# Patient Record
Sex: MALE | Race: WHITE | HISPANIC OR LATINO | Employment: FULL TIME | URBAN - METROPOLITAN AREA
[De-identification: names, ages, dates, MRNs, and addresses within clinical notes are randomized per-mention and may not be internally consistent; named-entity substitution may affect disease eponyms.]

---

## 2018-10-24 ENCOUNTER — TELEPHONE (OUTPATIENT)
Dept: MEDICAL GROUP | Facility: LAB | Age: 38
End: 2018-10-24

## 2018-10-24 ENCOUNTER — HOSPITAL ENCOUNTER (INPATIENT)
Facility: MEDICAL CENTER | Age: 38
LOS: 2 days | DRG: 638 | End: 2018-10-26
Attending: EMERGENCY MEDICINE | Admitting: HOSPITALIST
Payer: COMMERCIAL

## 2018-10-24 DIAGNOSIS — E10.10 DIABETIC KETOACIDOSIS WITHOUT COMA ASSOCIATED WITH TYPE 1 DIABETES MELLITUS (HCC): ICD-10-CM

## 2018-10-24 PROBLEM — E87.1 HYPONATREMIA: Status: ACTIVE | Noted: 2018-10-24

## 2018-10-24 PROBLEM — R17 SERUM TOTAL BILIRUBIN ELEVATED: Status: ACTIVE | Noted: 2018-10-24

## 2018-10-24 PROBLEM — E11.10 DKA (DIABETIC KETOACIDOSES): Status: ACTIVE | Noted: 2018-10-24

## 2018-10-24 PROBLEM — E86.0 DEHYDRATION: Status: ACTIVE | Noted: 2018-10-24

## 2018-10-24 LAB
ALBUMIN SERPL BCP-MCNC: 5 G/DL (ref 3.2–4.9)
ALBUMIN/GLOB SERPL: 1.5 G/DL
ALP SERPL-CCNC: 160 U/L (ref 30–99)
ALT SERPL-CCNC: 38 U/L (ref 2–50)
ANION GAP SERPL CALC-SCNC: 15 MMOL/L (ref 0–11.9)
ANION GAP SERPL CALC-SCNC: 19 MMOL/L (ref 0–11.9)
APPEARANCE UR: CLEAR
AST SERPL-CCNC: 30 U/L (ref 12–45)
B-OH-BUTYR SERPL-MCNC: 6.16 MMOL/L (ref 0.02–0.27)
BASE EXCESS BLDV CALC-SCNC: -5 MMOL/L
BASOPHILS # BLD AUTO: 0.4 % (ref 0–1.8)
BASOPHILS # BLD: 0.04 K/UL (ref 0–0.12)
BILIRUB SERPL-MCNC: 1.8 MG/DL (ref 0.1–1.5)
BILIRUB UR QL STRIP.AUTO: NEGATIVE
BODY TEMPERATURE: ABNORMAL CENTIGRADE
BUN SERPL-MCNC: 16 MG/DL (ref 8–22)
BUN SERPL-MCNC: 21 MG/DL (ref 8–22)
CALCIUM SERPL-MCNC: 8.8 MG/DL (ref 8.4–10.2)
CALCIUM SERPL-MCNC: 9.7 MG/DL (ref 8.4–10.2)
CHLORIDE SERPL-SCNC: 87 MMOL/L (ref 96–112)
CHLORIDE SERPL-SCNC: 94 MMOL/L (ref 96–112)
CO2 SERPL-SCNC: 18 MMOL/L (ref 20–33)
CO2 SERPL-SCNC: 20 MMOL/L (ref 20–33)
COLOR UR: ABNORMAL
CREAT SERPL-MCNC: 1 MG/DL (ref 0.5–1.4)
CREAT SERPL-MCNC: 1.12 MG/DL (ref 0.5–1.4)
EOSINOPHIL # BLD AUTO: 0.01 K/UL (ref 0–0.51)
EOSINOPHIL NFR BLD: 0.1 % (ref 0–6.9)
ERYTHROCYTE [DISTWIDTH] IN BLOOD BY AUTOMATED COUNT: 36 FL (ref 35.9–50)
GLOBULIN SER CALC-MCNC: 3.3 G/DL (ref 1.9–3.5)
GLUCOSE BLD-MCNC: 344 MG/DL (ref 65–99)
GLUCOSE BLD-MCNC: 347 MG/DL (ref 65–99)
GLUCOSE BLD-MCNC: 581 MG/DL (ref 65–99)
GLUCOSE SERPL-MCNC: 358 MG/DL (ref 65–99)
GLUCOSE SERPL-MCNC: 488 MG/DL (ref 65–99)
GLUCOSE UR STRIP.AUTO-MCNC: >=1000 MG/DL
HCO3 BLDV-SCNC: 20 MMOL/L (ref 24–28)
HCT VFR BLD AUTO: 42.2 % (ref 42–52)
HGB BLD-MCNC: 15.7 G/DL (ref 14–18)
IMM GRANULOCYTES # BLD AUTO: 0.03 K/UL (ref 0–0.11)
IMM GRANULOCYTES NFR BLD AUTO: 0.3 % (ref 0–0.9)
KETONES UR STRIP.AUTO-MCNC: >=80 MG/DL
LEUKOCYTE ESTERASE UR QL STRIP.AUTO: NEGATIVE
LYMPHOCYTES # BLD AUTO: 2.05 K/UL (ref 1–4.8)
LYMPHOCYTES NFR BLD: 20.5 % (ref 22–41)
MAGNESIUM SERPL-MCNC: 2.1 MG/DL (ref 1.5–2.5)
MCH RBC QN AUTO: 31.7 PG (ref 27–33)
MCHC RBC AUTO-ENTMCNC: 37.2 G/DL (ref 33.7–35.3)
MCV RBC AUTO: 85.3 FL (ref 81.4–97.8)
MICRO URNS: ABNORMAL
MONOCYTES # BLD AUTO: 0.67 K/UL (ref 0–0.85)
MONOCYTES NFR BLD AUTO: 6.7 % (ref 0–13.4)
MUCOUS THREADS #/AREA URNS HPF: ABNORMAL /HPF
NEUTROPHILS # BLD AUTO: 7.21 K/UL (ref 1.82–7.42)
NEUTROPHILS NFR BLD: 72 % (ref 44–72)
NITRITE UR QL STRIP.AUTO: NEGATIVE
NRBC # BLD AUTO: 0 K/UL
NRBC BLD-RTO: 0 /100 WBC
PCO2 BLDV: 36.2 MMHG (ref 41–51)
PH BLDV: 7.35 [PH] (ref 7.31–7.45)
PH UR STRIP.AUTO: 5.5 [PH]
PHOSPHATE SERPL-MCNC: 5.2 MG/DL (ref 2.5–4.5)
PLATELET # BLD AUTO: 261 K/UL (ref 164–446)
PMV BLD AUTO: 9.7 FL (ref 9–12.9)
PO2 BLDV: 58.2 MMHG (ref 25–40)
POTASSIUM SERPL-SCNC: 3.5 MMOL/L (ref 3.6–5.5)
POTASSIUM SERPL-SCNC: 4.2 MMOL/L (ref 3.6–5.5)
PROT SERPL-MCNC: 8.3 G/DL (ref 6–8.2)
PROT UR QL STRIP: NEGATIVE MG/DL
RBC # BLD AUTO: 4.95 M/UL (ref 4.7–6.1)
RBC # URNS HPF: ABNORMAL /HPF
RBC UR QL AUTO: ABNORMAL
SAO2 % BLDV: 88 %
SODIUM SERPL-SCNC: 124 MMOL/L (ref 135–145)
SODIUM SERPL-SCNC: 129 MMOL/L (ref 135–145)
SP GR UR STRIP.AUTO: 1.02
TSH SERPL DL<=0.005 MIU/L-ACNC: 1.54 UIU/ML (ref 0.38–5.33)
UNIDENT CRYS URNS QL MICRO: ABNORMAL /HPF
WBC # BLD AUTO: 10 K/UL (ref 4.8–10.8)
WBC #/AREA URNS HPF: ABNORMAL /HPF

## 2018-10-24 PROCEDURE — 80053 COMPREHEN METABOLIC PANEL: CPT

## 2018-10-24 PROCEDURE — 83036 HEMOGLOBIN GLYCOSYLATED A1C: CPT

## 2018-10-24 PROCEDURE — 80048 BASIC METABOLIC PNL TOTAL CA: CPT

## 2018-10-24 PROCEDURE — 85025 COMPLETE CBC W/AUTO DIFF WBC: CPT

## 2018-10-24 PROCEDURE — 96361 HYDRATE IV INFUSION ADD-ON: CPT

## 2018-10-24 PROCEDURE — 700105 HCHG RX REV CODE 258: Performed by: HOSPITALIST

## 2018-10-24 PROCEDURE — 81001 URINALYSIS AUTO W/SCOPE: CPT

## 2018-10-24 PROCEDURE — 700102 HCHG RX REV CODE 250 W/ 637 OVERRIDE(OP): Performed by: EMERGENCY MEDICINE

## 2018-10-24 PROCEDURE — 700105 HCHG RX REV CODE 258: Performed by: EMERGENCY MEDICINE

## 2018-10-24 PROCEDURE — 84443 ASSAY THYROID STIM HORMONE: CPT

## 2018-10-24 PROCEDURE — 82803 BLOOD GASES ANY COMBINATION: CPT

## 2018-10-24 PROCEDURE — 700102 HCHG RX REV CODE 250 W/ 637 OVERRIDE(OP)

## 2018-10-24 PROCEDURE — 700102 HCHG RX REV CODE 250 W/ 637 OVERRIDE(OP): Performed by: HOSPITALIST

## 2018-10-24 PROCEDURE — 770006 HCHG ROOM/CARE - MED/SURG/GYN SEMI*

## 2018-10-24 PROCEDURE — 84100 ASSAY OF PHOSPHORUS: CPT

## 2018-10-24 PROCEDURE — 83735 ASSAY OF MAGNESIUM: CPT

## 2018-10-24 PROCEDURE — 99223 1ST HOSP IP/OBS HIGH 75: CPT | Performed by: HOSPITALIST

## 2018-10-24 PROCEDURE — 99285 EMERGENCY DEPT VISIT HI MDM: CPT

## 2018-10-24 PROCEDURE — 96372 THER/PROPH/DIAG INJ SC/IM: CPT

## 2018-10-24 PROCEDURE — 36415 COLL VENOUS BLD VENIPUNCTURE: CPT

## 2018-10-24 PROCEDURE — 82010 KETONE BODYS QUAN: CPT

## 2018-10-24 PROCEDURE — 82962 GLUCOSE BLOOD TEST: CPT

## 2018-10-24 PROCEDURE — 96360 HYDRATION IV INFUSION INIT: CPT

## 2018-10-24 RX ORDER — SODIUM CHLORIDE, SODIUM LACTATE, POTASSIUM CHLORIDE, CALCIUM CHLORIDE 600; 310; 30; 20 MG/100ML; MG/100ML; MG/100ML; MG/100ML
1000 INJECTION, SOLUTION INTRAVENOUS ONCE
Status: COMPLETED | OUTPATIENT
Start: 2018-10-24 | End: 2018-10-24

## 2018-10-24 RX ORDER — SODIUM CHLORIDE, SODIUM LACTATE, POTASSIUM CHLORIDE, AND CALCIUM CHLORIDE .6; .31; .03; .02 G/100ML; G/100ML; G/100ML; G/100ML
1000 INJECTION, SOLUTION INTRAVENOUS ONCE
Status: COMPLETED | OUTPATIENT
Start: 2018-10-24 | End: 2018-10-24

## 2018-10-24 RX ORDER — INSULIN GLARGINE 100 [IU]/ML
10 INJECTION, SOLUTION SUBCUTANEOUS EVERY EVENING
Status: DISCONTINUED | OUTPATIENT
Start: 2018-10-24 | End: 2018-10-26 | Stop reason: HOSPADM

## 2018-10-24 RX ORDER — SODIUM CHLORIDE, SODIUM LACTATE, POTASSIUM CHLORIDE, CALCIUM CHLORIDE 600; 310; 30; 20 MG/100ML; MG/100ML; MG/100ML; MG/100ML
1000 INJECTION, SOLUTION INTRAVENOUS CONTINUOUS
Status: DISCONTINUED | OUTPATIENT
Start: 2018-10-24 | End: 2018-10-26 | Stop reason: HOSPADM

## 2018-10-24 RX ORDER — DEXTROSE MONOHYDRATE 25 G/50ML
25 INJECTION, SOLUTION INTRAVENOUS
Status: DISCONTINUED | OUTPATIENT
Start: 2018-10-24 | End: 2018-10-26 | Stop reason: HOSPADM

## 2018-10-24 RX ADMIN — SODIUM CHLORIDE, POTASSIUM CHLORIDE, SODIUM LACTATE AND CALCIUM CHLORIDE 1000 ML: 600; 310; 30; 20 INJECTION, SOLUTION INTRAVENOUS at 17:22

## 2018-10-24 RX ADMIN — INSULIN GLARGINE 10 UNITS: 100 INJECTION, SOLUTION SUBCUTANEOUS at 20:45

## 2018-10-24 RX ADMIN — INSULIN HUMAN 10 UNITS: 100 INJECTION, SOLUTION PARENTERAL at 16:31

## 2018-10-24 RX ADMIN — SODIUM CHLORIDE, POTASSIUM CHLORIDE, SODIUM LACTATE AND CALCIUM CHLORIDE 1000 ML: 600; 310; 30; 20 INJECTION, SOLUTION INTRAVENOUS at 14:46

## 2018-10-24 RX ADMIN — INSULIN HUMAN 4 UNITS: 100 INJECTION, SOLUTION PARENTERAL at 20:44

## 2018-10-24 RX ADMIN — SODIUM CHLORIDE, POTASSIUM CHLORIDE, SODIUM LACTATE AND CALCIUM CHLORIDE 1000 ML: 600; 310; 30; 20 INJECTION, SOLUTION INTRAVENOUS at 16:31

## 2018-10-24 RX ADMIN — SODIUM CHLORIDE, POTASSIUM CHLORIDE, SODIUM LACTATE AND CALCIUM CHLORIDE 1000 ML: 600; 310; 30; 20 INJECTION, SOLUTION INTRAVENOUS at 19:32

## 2018-10-24 ASSESSMENT — ENCOUNTER SYMPTOMS
HEADACHES: 0
BACK PAIN: 0
SPUTUM PRODUCTION: 0
COUGH: 0
STRIDOR: 0
SHORTNESS OF BREATH: 0
BLURRED VISION: 0
NECK PAIN: 0
HEARTBURN: 0
TINGLING: 0
WEAKNESS: 1
DEPRESSION: 0
CONSTIPATION: 0
CLAUDICATION: 0
FEVER: 0
MEMORY LOSS: 0
ORTHOPNEA: 0
SENSORY CHANGE: 0
PALPITATIONS: 0
PND: 0
PHOTOPHOBIA: 0
TREMORS: 0
MYALGIAS: 0
DOUBLE VISION: 0
EYE PAIN: 0
NAUSEA: 0
VOMITING: 0
SORE THROAT: 0
DIZZINESS: 0
BLOOD IN STOOL: 0
SPEECH CHANGE: 0
CHILLS: 0
HEMOPTYSIS: 0
NERVOUS/ANXIOUS: 1

## 2018-10-24 ASSESSMENT — PATIENT HEALTH QUESTIONNAIRE - PHQ9
SUM OF ALL RESPONSES TO PHQ9 QUESTIONS 1 AND 2: 0
2. FEELING DOWN, DEPRESSED, IRRITABLE, OR HOPELESS: NOT AT ALL
1. LITTLE INTEREST OR PLEASURE IN DOING THINGS: NOT AT ALL

## 2018-10-24 ASSESSMENT — PAIN SCALES - GENERAL
PAINLEVEL_OUTOF10: 0
PAINLEVEL_OUTOF10: 0

## 2018-10-24 ASSESSMENT — LIFESTYLE VARIABLES
ALCOHOL_USE: NO
EVER_SMOKED: NEVER

## 2018-10-24 NOTE — TELEPHONE ENCOUNTER
NEW PATIENT VISIT PRE-VISIT PLANNING    1.  [employer] Patient is checked in Patient Demographics? YES    2.  Immunizations were updated in Epic using WebIZ?: Epic matches WebIZ       •  Web Iz Recommendations: FLU, MMR  and VARICELLA (Chicken Pox)     3.  Is this appointment scheduled as a Hospital Follow-Up? No    4.  Patient is due for the following Health Maintenance Topics:   Health Maintenance Due   Topic Date Due   • IMM DTaP/Tdap/Td Vaccine (1 - Tdap) 10/06/2001   • IMM INFLUENZA (1) 09/01/2018           5.  Reviewed/Updated the following with patient:   •   Preferred Pharmacy? NO       •   Preferred Lab? NO       •   Preferred Communication? NO       •   Allergies? NO       •   Medications? NO       •   Social History? NO       •   Family History (document living status of immediate family members and if + hx of cancer, diabetes, hypertension, hyperlipidemia, heart attack, stroke) NO    6.  Updated Care Team?       •   DME Company (gait device, O2, CPAP, etc.) N\A       •   Other Specialists (eye doctor, derm, GYN, cardiology, endo, etc): NO    7.  MDX printed for Provider? NO    8.  Patient was NOT informed to arrive 15 min prior to their   scheduled appointment and bring in their medication bottles.

## 2018-10-24 NOTE — ED PROVIDER NOTES
"ED Provider Note    ER PROVIDER NOTE         CHIEF COMPLAINT  Chief Complaint   Patient presents with   • Weakness     weakness, dizziness, weight loss x 2 weeks       HPI  Jhonatan Harry is a 38 y.o. male who presents to the emergency department complaining of generalized weakness, polyuria and polydipsia.  Patient reports his symptoms have primarily been over the last few weeks as well as a 40 pound weight loss in the last 2 weeks, unintentional.  He denies any abdominal pain but has had some nausea.  No chest pain or difficulty breathing.  No known history of diabetes and has not seen a physician in many years    REVIEW OF SYSTEMS  Pertinent positives include polyuria, weight loss. Pertinent negatives include no abdominal pain. See HPI for details. All other systems reviewed and are negative.    PAST MEDICAL HISTORY   None known    SURGICAL HISTORY  patient denies any surgical history    FAMILY HISTORY  History reviewed. No pertinent family history.    SOCIAL HISTORY  Social History     Social History   • Marital status:      Spouse name: N/A   • Number of children: N/A   • Years of education: N/A     Social History Main Topics   • Smoking status: Never Smoker   • Smokeless tobacco: Not on file   • Alcohol use Yes      Comment: rare   • Drug use: No   • Sexual activity: Not on file     Other Topics Concern   • Not on file     Social History Narrative   • No narrative on file      History   Drug Use No       CURRENT MEDICATIONS  Home Medications     Reviewed by Brunilda Cook (Pharmacy Tech) on 10/24/18 at 1544  Med List Status: Complete   Medication Last Dose Status        Patient Chinedu Taking any Medications                       ALLERGIES  No Known Allergies    PHYSICAL EXAM  VITAL SIGNS: /75   Pulse 70   Temp 36.9 °C (98.4 °F)   Resp 16   Ht 1.676 m (5' 6\")   Wt 67.3 kg (148 lb 5.9 oz)   SpO2 97%   BMI 23.95 kg/m²   Pulse ox interpretation: I interpret this pulse ox as " normal.    Constitutional: Alert in no apparent distress.  HENT: No signs of trauma, Bilateral external ears normal, Nose normal.  Mucous membranes dry  Eyes: Pupils are equal and reactive, Conjunctiva normal, Non-icteric.   Neck: Normal range of motion, No tenderness, Supple, No stridor.   Lymphatic: No lymphadenopathy noted.   Cardiovascular: Regular rate and rhythm, no murmurs.   Thorax & Lungs: Normal breath sounds, No respiratory distress, No wheezing, No chest tenderness.   Abdomen: Bowel sounds normal, Soft, No tenderness, No masses, No pulsatile masses. No peritoneal signs.  Skin: Warm, Dry, No erythema, No rash.   Back: No bony tenderness, No CVA tenderness.   Extremities: Intact distal pulses, No edema, No tenderness, No cyanosis, Negative Seth's sign.  Musculoskeletal: Good range of motion in all major joints. No tenderness to palpation or major deformities noted.   Neurologic: Alert , Normal motor function, Normal sensory function, No focal deficits noted.   Psychiatric: Affect normal, Judgment normal, Mood normal.     DIAGNOSTIC STUDIES / PROCEDURES        LABS  Labs Reviewed   CBC WITH DIFFERENTIAL - Abnormal; Notable for the following:        Result Value    MCHC 37.2 (*)     Lymphocytes 20.50 (*)     All other components within normal limits   COMP METABOLIC PANEL - Abnormal; Notable for the following:     Sodium 124 (*)     Chloride 87 (*)     Co2 18 (*)     Anion Gap 19.0 (*)     Glucose 488 (*)     Alkaline Phosphatase 160 (*)     Total Bilirubin 1.8 (*)     Albumin 5.0 (*)     Total Protein 8.3 (*)     All other components within normal limits   PHOSPHORUS - Abnormal; Notable for the following:     Phosphorus 5.2 (*)     All other components within normal limits   VENOUS BLOOD GAS - Abnormal; Notable for the following:     Venous Bg Pco2 36.2 (*)     Venous Bg Po2 58.2 (*)     Venous Bg Hco3 20 (*)     All other components within normal limits   URINALYSIS - Abnormal; Notable for the following:      Glucose >=1000 (*)     Ketones >=80 (*)     Occult Blood Trace (*)     All other components within normal limits   URINE MICROSCOPIC (W/UA) - Abnormal; Notable for the following:     WBC 0-2 (*)     All other components within normal limits   ACCU-CHEK GLUCOSE - Abnormal; Notable for the following:     Glucose - Accu-Ck 581 (*)     All other components within normal limits   MAGNESIUM   BETA-HYDROXYBUTYRIC ACID   ESTIMATED GFR   BASIC METABOLIC PANEL       All labs reviewed by me.    RADIOLOGY  No orders to display     The radiologist's interpretation of all radiological studies have been reviewed by me.    COURSE & MEDICAL DECISION MAKING  Nursing notes, VS, PMSFHx reviewed in chart.    3:06 PM Patient seen and examined at bedside. Patient will be treated with IV fluids. Ordered for labs to evaluate his symptoms.       4:25 PM  Patient reevaluated, updated on results, additional fluid and insulin  HYDRATION: Based on the patient's presentation of Dehydration and DKA the patient was given IV fluids. IV Hydration was used becasue oral hydration was not as rapid as required. Upon recheck following hydration, the patient was Improved.    Discussed case with Dr. Moncada for admission    Decision Making:  This is a 38 y.o. male presenting with weakness polyuria and weight loss.  He does appear to be new onset diabetes as well as mild DKA.  He has been given IV fluids, subcu insulin and will be admitted for further care.  There is no other evidence of electrolyte derangement or renal dysfunction    Patient is admitted in guarded condition    The total critical care time spent on this patient was 40 minutes, resuscitating patient, speaking with admitting physician, and interpreting test results. This 40 minutes is exclusive of separately billable procedures.      FINAL IMPRESSION  1. Diabetic ketoacidosis without coma associated with type 1 diabetes mellitus (HCC)         The note accurately reflects work and  decisions made by me.  Niles Cool  10/24/2018  4:26 PM

## 2018-10-24 NOTE — ED NOTES
ERP at bedside. Pt agrees with plan of care discussed by ERP. AIDET acknowledged with patient. Jono in low position, side rail up for pt safety. Call light within reach. Will continue to monitor.

## 2018-10-25 ENCOUNTER — APPOINTMENT (OUTPATIENT)
Dept: RADIOLOGY | Facility: MEDICAL CENTER | Age: 38
DRG: 638 | End: 2018-10-25
Attending: HOSPITALIST
Payer: COMMERCIAL

## 2018-10-25 ENCOUNTER — APPOINTMENT (OUTPATIENT)
Dept: MEDICAL GROUP | Facility: LAB | Age: 38
End: 2018-10-25
Payer: COMMERCIAL

## 2018-10-25 PROBLEM — E87.6 HYPOKALEMIA: Status: ACTIVE | Noted: 2018-10-25

## 2018-10-25 LAB
ALBUMIN SERPL BCP-MCNC: 3.2 G/DL (ref 3.2–4.9)
ALBUMIN/GLOB SERPL: 1.3 G/DL
ALP SERPL-CCNC: 90 U/L (ref 30–99)
ALT SERPL-CCNC: 27 U/L (ref 2–50)
ANION GAP SERPL CALC-SCNC: 7 MMOL/L (ref 0–11.9)
AST SERPL-CCNC: 23 U/L (ref 12–45)
BASOPHILS # BLD AUTO: 0.4 % (ref 0–1.8)
BASOPHILS # BLD: 0.03 K/UL (ref 0–0.12)
BILIRUB SERPL-MCNC: 1.2 MG/DL (ref 0.1–1.5)
BUN SERPL-MCNC: 11 MG/DL (ref 8–22)
CALCIUM SERPL-MCNC: 8.3 MG/DL (ref 8.4–10.2)
CHLORIDE SERPL-SCNC: 97 MMOL/L (ref 96–112)
CHOLEST SERPL-MCNC: 195 MG/DL (ref 100–199)
CO2 SERPL-SCNC: 28 MMOL/L (ref 20–33)
CREAT SERPL-MCNC: 0.84 MG/DL (ref 0.5–1.4)
EOSINOPHIL # BLD AUTO: 0.1 K/UL (ref 0–0.51)
EOSINOPHIL NFR BLD: 1.4 % (ref 0–6.9)
ERYTHROCYTE [DISTWIDTH] IN BLOOD BY AUTOMATED COUNT: 36.1 FL (ref 35.9–50)
EST. AVERAGE GLUCOSE BLD GHB EST-MCNC: 404 MG/DL
GLOBULIN SER CALC-MCNC: 2.4 G/DL (ref 1.9–3.5)
GLUCOSE BLD-MCNC: 283 MG/DL (ref 65–99)
GLUCOSE BLD-MCNC: 310 MG/DL (ref 65–99)
GLUCOSE SERPL-MCNC: 292 MG/DL (ref 65–99)
HBA1C MFR BLD: 15.7 % (ref 0–5.6)
HCT VFR BLD AUTO: 34.8 % (ref 42–52)
HDLC SERPL-MCNC: 26 MG/DL
HGB BLD-MCNC: 12.7 G/DL (ref 14–18)
IMM GRANULOCYTES # BLD AUTO: 0.02 K/UL (ref 0–0.11)
IMM GRANULOCYTES NFR BLD AUTO: 0.3 % (ref 0–0.9)
LDLC SERPL CALC-MCNC: 112 MG/DL
LYMPHOCYTES # BLD AUTO: 2.93 K/UL (ref 1–4.8)
LYMPHOCYTES NFR BLD: 40.1 % (ref 22–41)
MCH RBC QN AUTO: 30.8 PG (ref 27–33)
MCHC RBC AUTO-ENTMCNC: 36.5 G/DL (ref 33.7–35.3)
MCV RBC AUTO: 84.5 FL (ref 81.4–97.8)
MONOCYTES # BLD AUTO: 0.64 K/UL (ref 0–0.85)
MONOCYTES NFR BLD AUTO: 8.8 % (ref 0–13.4)
NEUTROPHILS # BLD AUTO: 3.58 K/UL (ref 1.82–7.42)
NEUTROPHILS NFR BLD: 49 % (ref 44–72)
NRBC # BLD AUTO: 0 K/UL
NRBC BLD-RTO: 0 /100 WBC
PLATELET # BLD AUTO: 212 K/UL (ref 164–446)
PMV BLD AUTO: 9.7 FL (ref 9–12.9)
POTASSIUM SERPL-SCNC: 3.4 MMOL/L (ref 3.6–5.5)
PROT SERPL-MCNC: 5.6 G/DL (ref 6–8.2)
RBC # BLD AUTO: 4.12 M/UL (ref 4.7–6.1)
SODIUM SERPL-SCNC: 132 MMOL/L (ref 135–145)
TRIGL SERPL-MCNC: 283 MG/DL (ref 0–149)
WBC # BLD AUTO: 7.3 K/UL (ref 4.8–10.8)

## 2018-10-25 PROCEDURE — 700105 HCHG RX REV CODE 258: Performed by: HOSPITALIST

## 2018-10-25 PROCEDURE — 76705 ECHO EXAM OF ABDOMEN: CPT

## 2018-10-25 PROCEDURE — 80061 LIPID PANEL: CPT

## 2018-10-25 PROCEDURE — 85025 COMPLETE CBC W/AUTO DIFF WBC: CPT

## 2018-10-25 PROCEDURE — 700102 HCHG RX REV CODE 250 W/ 637 OVERRIDE(OP): Performed by: HOSPITALIST

## 2018-10-25 PROCEDURE — 36415 COLL VENOUS BLD VENIPUNCTURE: CPT

## 2018-10-25 PROCEDURE — 82962 GLUCOSE BLOOD TEST: CPT

## 2018-10-25 PROCEDURE — A9270 NON-COVERED ITEM OR SERVICE: HCPCS | Performed by: INTERNAL MEDICINE

## 2018-10-25 PROCEDURE — 99232 SBSQ HOSP IP/OBS MODERATE 35: CPT | Performed by: INTERNAL MEDICINE

## 2018-10-25 PROCEDURE — 700102 HCHG RX REV CODE 250 W/ 637 OVERRIDE(OP): Performed by: INTERNAL MEDICINE

## 2018-10-25 PROCEDURE — 770006 HCHG ROOM/CARE - MED/SURG/GYN SEMI*

## 2018-10-25 PROCEDURE — 80053 COMPREHEN METABOLIC PANEL: CPT

## 2018-10-25 RX ORDER — POTASSIUM CHLORIDE 20 MEQ/1
40 TABLET, EXTENDED RELEASE ORAL 2 TIMES DAILY
Status: COMPLETED | OUTPATIENT
Start: 2018-10-25 | End: 2018-10-25

## 2018-10-25 RX ADMIN — INSULIN HUMAN 4 UNITS: 100 INJECTION, SOLUTION PARENTERAL at 05:58

## 2018-10-25 RX ADMIN — POTASSIUM CHLORIDE 40 MEQ: 1500 TABLET, EXTENDED RELEASE ORAL at 17:48

## 2018-10-25 RX ADMIN — POTASSIUM CHLORIDE 40 MEQ: 1500 TABLET, EXTENDED RELEASE ORAL at 11:07

## 2018-10-25 RX ADMIN — INSULIN GLARGINE 10 UNITS: 100 INJECTION, SOLUTION SUBCUTANEOUS at 17:49

## 2018-10-25 RX ADMIN — SODIUM CHLORIDE, POTASSIUM CHLORIDE, SODIUM LACTATE AND CALCIUM CHLORIDE 1000 ML: 600; 310; 30; 20 INJECTION, SOLUTION INTRAVENOUS at 01:59

## 2018-10-25 ASSESSMENT — ENCOUNTER SYMPTOMS
SPUTUM PRODUCTION: 0
LOSS OF CONSCIOUSNESS: 0
SHORTNESS OF BREATH: 0
DIZZINESS: 0
STRIDOR: 0
ABDOMINAL PAIN: 0
CHILLS: 0
DEPRESSION: 0
FALLS: 0
TINGLING: 0
DIARRHEA: 0
MYALGIAS: 0
HEADACHES: 0
PALPITATIONS: 0
NAUSEA: 0
FEVER: 0
WEAKNESS: 0
COUGH: 0
VOMITING: 0
CONSTIPATION: 0

## 2018-10-25 ASSESSMENT — PAIN SCALES - GENERAL
PAINLEVEL_OUTOF10: 0

## 2018-10-25 ASSESSMENT — PATIENT HEALTH QUESTIONNAIRE - PHQ9
1. LITTLE INTEREST OR PLEASURE IN DOING THINGS: NOT AT ALL
SUM OF ALL RESPONSES TO PHQ9 QUESTIONS 1 AND 2: 0
2. FEELING DOWN, DEPRESSED, IRRITABLE, OR HOPELESS: NOT AT ALL

## 2018-10-25 NOTE — ED NOTES
Accucheck after 2L of LR bolus and 10U regular insulin, 347. Second BMP drawn and sent to lab. Third liter LR infusing.

## 2018-10-25 NOTE — ED NOTES
Randolph Fall Risk Assessment done. pt did not meet criteria, will continue to monitor for changes for pt safety. Bed down, side rails up and call light within reach.

## 2018-10-25 NOTE — PROGRESS NOTES
Pt awake and alert, on his way to BR w/. steady gait. W/. Awareness for safety. POC re  Blood works, blood sugar monitoring and ivf discussed to pt/ verbalized understanding. Call light use w/. In reach. Will continue to monitor.

## 2018-10-25 NOTE — H&P
Hospital Medicine History and Physical    Date of Service  10/24/2018    Chief Complaint  Chief Complaint   Patient presents with   • Weakness     weakness, dizziness, weight loss x 2 weeks       History of Presenting Illness  38 y.o. male with No significant past medical history presented 10/24/2018 to the ER for evaluation of feeling of generalized weakness in addition to polyuria and polydipsia which really started several weeks ago, has been persistent. Patient is complaining of her 40 pound weight loss over the past 2 weeks which was noted to be unintentional. Patient said what was ordered a somewhat as he is unable to eat without having to wash it down with water as his mouth is constantly dry.    On admission last patient was noted to have a blood glucose over 580, patient has not had any previous past medical histories. Patient is noted to be in a state of diabetic acidosis  Patient will be admitted for further diagnostic workup including supportive management.                  Primary Care Physician  No primary care provider on file.    Consultants  None    Code Status  Code: Full code    Review of Systems  Review of Systems   Constitutional: Positive for malaise/fatigue. Negative for chills and fever.   HENT: Negative for congestion, hearing loss, sore throat and tinnitus.    Eyes: Negative for blurred vision, double vision, photophobia and pain.   Respiratory: Negative for cough, hemoptysis, sputum production, shortness of breath and stridor.    Cardiovascular: Negative for chest pain, palpitations, orthopnea, claudication and PND.   Gastrointestinal: Negative for blood in stool, constipation, heartburn, melena, nausea and vomiting.   Genitourinary: Negative for dysuria, frequency and urgency.   Musculoskeletal: Negative for back pain, myalgias and neck pain.   Neurological: Positive for weakness. Negative for dizziness, tingling, tremors, sensory change, speech change and headaches.    Psychiatric/Behavioral: Negative for depression, memory loss and suicidal ideas. The patient is nervous/anxious.           Past Medical History  Patient denies having any past medical history    Surgical History  Patient denies having any surgical past history    Medications  Denies using any medications   Family History  Patient denies family history of diabetes    Social History  Social History   Substance Use Topics   • Smoking status: Never Smoker   • Smokeless tobacco: Not on file   • Alcohol use Yes      Comment: rare       Allergies  No Known Allergies     Physical Exam  Laboratory   Hemodynamics  Temp (24hrs), Av.9 °C (98.4 °F), Min:36.9 °C (98.4 °F), Max:36.9 °C (98.4 °F)   Temperature: 36.9 °C (98.4 °F)  Pulse  Av.1  Min: 67  Max: 88    Blood Pressure: 129/75, NIBP: 128/73      Respiratory      Respiration: 16, Pulse Oximetry: 97 %             Physical Exam   Constitutional: He is oriented to person, place, and time. He appears well-developed and well-nourished. No distress.   HENT:   Head: Normocephalic and atraumatic.   Mouth/Throat: No oropharyngeal exudate.   Mucous membranes dry   Eyes: Pupils are equal, round, and reactive to light. Conjunctivae are normal. Right eye exhibits no discharge. No scleral icterus.   Neck: Neck supple. No JVD present. No thyromegaly present.   Cardiovascular: Normal rate and intact distal pulses.    No murmur heard.  Pulses:       Dorsalis pedis pulses are 2+ on the right side, and 2+ on the left side.   Cap refill < 3 s   Pulmonary/Chest: Effort normal and breath sounds normal. No stridor. No respiratory distress. He has no wheezes. He has no rales.   Abdominal: Soft. Bowel sounds are normal. He exhibits no distension. There is no tenderness. There is no rebound.   Musculoskeletal: Normal range of motion. He exhibits no edema.   Neurological: He is alert and oriented to person, place, and time. No cranial nerve deficit.   Skin: Skin is warm and dry. He is not  diaphoretic. No erythema.   Psychiatric: He has a normal mood and affect. His behavior is normal. Thought content normal.         Assessment/Plan  * DKA (diabetic ketoacidoses) ([facility])   Assessment & Plan    New onset diabetes  Blood glucose on admit 581  Serum bicarb 20<18 and Anion gap 15<19 ~ improvement after 3L of IV fluids and 10U of insulin.   We will start patient on Lantus 10U  Continue Insulin-sliding scale, accu-checks and hypoglycemia protocol.  Check A1c  Diabetic and dietary education ordered.  BMP q4 hrs x3         Dehydration   Assessment & Plan    Secondary to diabetic ketoacidosis mediated polyuria and polydipsia  Start IV fluids        Serum total bilirubin elevated   Assessment & Plan    No abdominal symptoms,   AST/ALT WNL.  T-Bili 1.8 , ALK Phos 160  Liver ultrasound pending.           Hyponatremia   Assessment & Plan    Pseudohyponatremia from elevated blood glucose  Volume resuscitation and blood glucose control  Repeat BMP            I anticipate this patient will require at least two midnights for appropriate medical management, necessitating inpatient admission.    Prophylaxis: lovenox    Recent Labs      10/24/18   1500   WBC  10.0   RBC  4.95   HEMOGLOBIN  15.7   HEMATOCRIT  42.2   MCV  85.3   MCH  31.7   MCHC  37.2*   RDW  36.0   PLATELETCT  261   MPV  9.7     Recent Labs      10/24/18   1500  10/24/18   1722   SODIUM  124*  129*   POTASSIUM  4.2  3.5*   CHLORIDE  87*  94*   CO2  18*  20   GLUCOSE  488*  358*   BUN  21  16   CREATININE  1.12  1.00   CALCIUM  9.7  8.8     Recent Labs      10/24/18   1500  10/24/18   1722   ALTSGPT  38   --    ASTSGOT  30   --    ALKPHOSPHAT  160*   --    TBILIRUBIN  1.8*   --    GLUCOSE  488*  358*                 No results found for: TROPONINI    Imaging  US-RUQ    (Results Pending)

## 2018-10-25 NOTE — PROGRESS NOTES
Hospital Medicine Daily Progress Note    Date of Service  10/25/2018    Chief Complaint  38 y.o. male admitted 10/24/2018 with weakness and weight loss.    Hospital Course  Patient also had polyuria and polydipsia, dry mouth.  Upon arrival patient was noted to be in diabetic ketoacidosis, had never been diagnosed diabetic.  Patient does have family members who have diabetes.  Blood glucose was initially nearly 600, large gap.    Interval Problem Update  Patient improved with subcutaneous insulin as well as significant IV fluids.  Patient is undergoing diabetic education.  He most certainly will need insulin as an outpatient.  Discussed with family at bedside.    Consultants/Specialty  None    Code Status  Full    Disposition  Patient requires additional treatment in the hospital, should be able to go home at the time of discharge without needs    Review of Systems  Review of Systems   Constitutional: Negative for chills, fever and malaise/fatigue.   HENT: Negative for congestion.    Respiratory: Negative for cough, sputum production, shortness of breath and stridor.    Cardiovascular: Negative for chest pain, palpitations and leg swelling.   Gastrointestinal: Negative for abdominal pain, constipation, diarrhea, nausea and vomiting.   Genitourinary: Negative for dysuria and urgency.   Musculoskeletal: Negative for falls and myalgias.   Neurological: Negative for dizziness, tingling, loss of consciousness, weakness and headaches.   Psychiatric/Behavioral: Negative for depression and suicidal ideas.   All other systems reviewed and are negative.       Physical Exam  Temp:  [36.3 °C (97.4 °F)-37 °C (98.6 °F)] 36.3 °C (97.4 °F)  Pulse:  [47-88] 47  Resp:  [12-16] 16  BP: (105-129)/(59-75) 105/59    Physical Exam   Constitutional: He is oriented to person, place, and time. He appears well-developed.  Non-toxic appearance. No distress.   HENT:   Head: Normocephalic and atraumatic. Not macrocephalic and not microcephalic.  Head is without raccoon's eyes and without Davila's sign.   Mouth/Throat: Oropharynx is clear and moist. No oropharyngeal exudate.   Eyes: Conjunctivae are normal. Right eye exhibits no discharge. Left eye exhibits no discharge. No scleral icterus.   Neck: Normal range of motion. Neck supple. No tracheal deviation, no edema and no erythema present.   Cardiovascular: Normal rate, regular rhythm, normal heart sounds and intact distal pulses.  Exam reveals no gallop and no friction rub.    No murmur heard.  Pulmonary/Chest: Effort normal and breath sounds normal. No stridor. No respiratory distress. He has no wheezes. He has no rales. He exhibits no tenderness.   Abdominal: Soft. Bowel sounds are normal. He exhibits no distension. There is no splenomegaly or hepatomegaly. There is no tenderness. There is no rebound and no guarding.   Musculoskeletal: Normal range of motion. He exhibits no edema, tenderness or deformity.   Lymphadenopathy:     He has no cervical adenopathy.   Neurological: He is alert and oriented to person, place, and time. No cranial nerve deficit. Coordination normal.   Skin: Skin is warm and dry. No rash noted. He is not diaphoretic. No cyanosis or erythema. No pallor. Nails show no clubbing.   Psychiatric: He has a normal mood and affect. His speech is normal and behavior is normal. Judgment and thought content normal. Cognition and memory are normal.   Nursing note and vitals reviewed.      Fluids    Intake/Output Summary (Last 24 hours) at 10/25/18 0738  Last data filed at 10/25/18 0300   Gross per 24 hour   Intake              480 ml   Output              950 ml   Net             -470 ml       Laboratory  Recent Labs      10/24/18   1500  10/25/18   0431   WBC  10.0  7.3   RBC  4.95  4.12*   HEMOGLOBIN  15.7  12.7*   HEMATOCRIT  42.2  34.8*   MCV  85.3  84.5   MCH  31.7  30.8   MCHC  37.2*  36.5*   RDW  36.0  36.1   PLATELETCT  261  212   MPV  9.7  9.7     Recent Labs      10/24/18   1500   10/24/18   1722  10/25/18   0431   SODIUM  124*  129*  132*   POTASSIUM  4.2  3.5*  3.4*   CHLORIDE  87*  94*  97   CO2  18*  20  28   GLUCOSE  488*  358*  292*   BUN  21  16  11   CREATININE  1.12  1.00  0.84   CALCIUM  9.7  8.8  8.3*             Recent Labs      10/25/18   0431   TRIGLYCERIDE  283*   HDL  26*   LDL  112*       Imaging  US-RUQ   Final Result      Normal findings.           Assessment/Plan  * DKA (diabetic ketoacidoses) (HCC)- (present on admission)   Assessment & Plan    -Currently resolved, patient is tolerating a diet  -New diagnosis of diabetes, will need all supplies at discharge  -Continue diabetic education  -Continue Lantus, patient continues to have elevated sugars, increase insulin sliding scale  -Adjust diabetic medications as needed  -Gap has resolved, CO2 is normal        Hypokalemia   Assessment & Plan    -New, start oral potassium  -Repeat BMP in the morning        Dehydration- (present on admission)   Assessment & Plan    -Due to DKA  -Continue IV fluids        Serum total bilirubin elevated- (present on admission)   Assessment & Plan    -Resolved with IV fluids  -Ultrasound was normal  -Asymptomatic        Hyponatremia- (present on admission)   Assessment & Plan    -At this point is pseudohyponatremia, sugars are still elevated and hyponatremia is only mild  -Repeat BMP in the morning             VTE prophylaxis: SCDs

## 2018-10-25 NOTE — ASSESSMENT & PLAN NOTE
-Currently resolved, patient is tolerating a diet  -New diagnosis of diabetes, will need all supplies at discharge  -Continue diabetic education  -Continue Lantus, patient continues to have elevated sugars, increase insulin sliding scale  -Adjust diabetic medications as needed  -Gap has resolved, CO2 is normal

## 2018-10-25 NOTE — DISCHARGE PLANNING
Care Transition Team Assessment  Per RN Cara pt is a new diabetic and would like to know which glucometer is covered by the pt's insurance.  [facility] contacted [employer] and was informed that the One Touch and Roche is covered.  Pt's pharmacy that he uses is in [place], [place], they carry the one touch. Pt will dc home once medically cleared.  There are no SS needs identified at this time.     Information Source Patient  Information Given By: Patient  Informant's Name: Vicente  Who is responsible for making decisions for patient? : Patient    Readmission Evaluation  Is this a readmission?: No    Elopement Risk  Legal Hold: No  Ambulatory or Self Mobile in Wheelchair: No-Not an Elopement Risk    Interdisciplinary Discharge Planning  Does Admitting Nurse Feel This Could be a Complex Discharge?: No  Primary Care Physician: None  Lives with - Patient's Self Care Capacity: Unable To Determine At This Time  Patient or legal guardian wants to designate a caregiver (see row info): No  Support Systems: Children, Friends / Neighbors, Spouse / Significant Other  Housing / Facility: 1 Story Apartment / Condo  Do You Take your Prescribed Medications Regularly: Yes  Able to Return to Previous ADL's: Yes  Mobility Issues: No  Prior Services: Home-Independent  Patient Expects to be Discharged to:: Home  Assistance Needed: No  Durable Medical Equipment: Not Applicable    Discharge Preparedness  What is your plan after discharge?: Home with help  What are your discharge supports?: Spouse  Prior Functional Level: Ambulatory    Functional Assesment  Prior Functional Level: Ambulatory    Vision / Hearing Impairment  Vision Impairment : No  Hearing Impairment : No    Values / Beliefs / Concerns  Values / Beliefs Concerns : No  Special Hospitalization Concerns: Blood works    Domestic Abuse  Have you ever been the victim of abuse or violence?: No  Physical Abuse or Sexual Abuse: No  Verbal Abuse or Emotional Abuse: No  Possible Abuse Reported to::  Not Applicable    Discharge Risks or Barriers  Discharge risks or barriers?: No    Anticipated Discharge Information  Anticipated discharge disposition: Home

## 2018-10-25 NOTE — ASSESSMENT & PLAN NOTE
-At this point is pseudohyponatremia, sugars are still elevated and hyponatremia is only mild  -Repeat BMP in the morning

## 2018-10-25 NOTE — DIETARY
NUTRITION SERVICES - DM Education Consult Completed  The pt is a 37 yo male with an admitting dx of DKA    Pt seen in room today to provide diabetes diet education per a consult received today. Pt is newly diagnosed. A1C still pending at this time. Discussed CHO counting, label reading, goal servings of CHO for meals/snacks, exchange lists, fiber/protein/fat effects on glycemic control, snacking, strategies for home routine, checking BG, purpose of A1C. Questions answered and handouts provided. Pt to review material with his wife tonight and RD to check in tomorrow to see if he has any additional questions prior to DC.

## 2018-10-26 VITALS
HEIGHT: 66 IN | WEIGHT: 148.37 LBS | BODY MASS INDEX: 23.84 KG/M2 | RESPIRATION RATE: 16 BRPM | TEMPERATURE: 97.6 F | SYSTOLIC BLOOD PRESSURE: 110 MMHG | HEART RATE: 54 BPM | DIASTOLIC BLOOD PRESSURE: 62 MMHG | OXYGEN SATURATION: 100 %

## 2018-10-26 PROBLEM — E86.0 DEHYDRATION: Status: RESOLVED | Noted: 2018-10-24 | Resolved: 2018-10-26

## 2018-10-26 PROBLEM — E87.6 HYPOKALEMIA: Status: RESOLVED | Noted: 2018-10-25 | Resolved: 2018-10-26

## 2018-10-26 PROBLEM — R17 SERUM TOTAL BILIRUBIN ELEVATED: Status: RESOLVED | Noted: 2018-10-24 | Resolved: 2018-10-26

## 2018-10-26 PROBLEM — E87.1 HYPONATREMIA: Status: RESOLVED | Noted: 2018-10-24 | Resolved: 2018-10-26

## 2018-10-26 PROBLEM — E11.10 DKA (DIABETIC KETOACIDOSES): Status: RESOLVED | Noted: 2018-10-24 | Resolved: 2018-10-26

## 2018-10-26 LAB — GLUCOSE BLD-MCNC: 183 MG/DL (ref 65–99)

## 2018-10-26 PROCEDURE — 99239 HOSP IP/OBS DSCHRG MGMT >30: CPT | Performed by: INTERNAL MEDICINE

## 2018-10-26 PROCEDURE — 700105 HCHG RX REV CODE 258: Performed by: HOSPITALIST

## 2018-10-26 PROCEDURE — 82962 GLUCOSE BLOOD TEST: CPT

## 2018-10-26 RX ORDER — PEN NEEDLE, DIABETIC 29 G X1/2"
10 NEEDLE, DISPOSABLE MISCELLANEOUS 4 TIMES DAILY
Qty: 120 EACH | Refills: 0 | Status: SHIPPED | OUTPATIENT
Start: 2018-10-26 | End: 2018-11-07 | Stop reason: CLARIF

## 2018-10-26 RX ORDER — GLUCOSAMINE HCL/CHONDROITIN SU 500-400 MG
CAPSULE ORAL
Qty: 100 EACH | Refills: 0 | Status: SHIPPED | OUTPATIENT
Start: 2018-10-26 | End: 2018-11-07 | Stop reason: CLARIF

## 2018-10-26 RX ORDER — LANCETS 30 GAUGE
EACH MISCELLANEOUS
Qty: 100 EACH | Refills: 0 | Status: SHIPPED | OUTPATIENT
Start: 2018-10-26 | End: 2018-11-07 | Stop reason: CLARIF

## 2018-10-26 RX ADMIN — SODIUM CHLORIDE, POTASSIUM CHLORIDE, SODIUM LACTATE AND CALCIUM CHLORIDE 1000 ML: 600; 310; 30; 20 INJECTION, SOLUTION INTRAVENOUS at 01:55

## 2018-10-26 ASSESSMENT — PAIN SCALES - GENERAL: PAINLEVEL_OUTOF10: 0

## 2018-10-26 NOTE — DISCHARGE INSTRUCTIONS
Follow-up  with Primary Care Physician within 1 week,  contacted   ER if emergency  Discharge Instructions    Discharged to home by car with relative. Discharged via wheelchair, hospital escort: Yes.  Special equipment needed: Not Applicable    Be sure to schedule a follow-up appointment with your primary care doctor or any specialists as instructed.     Discharge Plan:   Influenza Vaccine Indication: Patient Refuses    I understand that a diet low in cholesterol, fat, and sodium is recommended for good health. Unless I have been given specific instructions below for another diet, I accept this instruction as my diet prescription.   Other diet: diabetic    Special Instructions: None    · Is patient discharged on Warfarin / Coumadin?   No     Depression / Suicide Risk    As you are discharged from this [facility] facility, it is important to learn how to keep safe from harming yourself.    Recognize the warning signs:  · Abrupt changes in personality, positive or negative- including increase in energy   · Giving away possessions  · Change in eating patterns- significant weight changes-  positive or negative  · Change in sleeping patterns- unable to sleep or sleeping all the time   · Unwillingness or inability to communicate  · Depression  · Unusual sadness, discouragement and loneliness  · Talk of wanting to die  · Neglect of personal appearance   · Rebelliousness- reckless behavior  · Withdrawal from people/activities they love  · Confusion- inability to concentrate     If you or a loved one observes any of these behaviors or has concerns about self-harm, here's what you can do:  · Talk about it- your feelings and reasons for harming yourself  · Remove any means that you might use to hurt yourself (examples: pills, rope, extension cords, firearm)  · Get professional help from the community (Mental Health, Substance Abuse, psychological counseling)  · Do not be alone:Call your Safe Contact- someone whom  you trust who will be there for you.  · Call your local CRISIS HOTLINE 092-4725 or 499-101-7632  · Call your local Children's Mobile Crisis Response Team [place] (395) 702-6708 or www.MediSapiens  · Call the toll free National Suicide Prevention Hotlines   · National Suicide Prevention Lifeline 940-863-THWM (4134)  · National [employer] Line Network 800-SUICIDE (525-1384)

## 2018-10-26 NOTE — PROGRESS NOTES
Assumed care of patient at 1900. A&Ox4, VSS. Denies pain.Uses call light appropriately. Up independently. Patient is feeling overwhelmed about diabetic teaching and wanted a break tonight from it. I told the patient we can resume teaching tomorrow. No other complaints at this time, resting comfortably in bed.

## 2018-10-26 NOTE — CARE PLAN
Problem: Safety  Goal: Will remain free from falls  Outcome: PROGRESSING AS EXPECTED  Risk for falls assessed, not at risk for falls. Patient educated on call light and general fall precautions.

## 2018-10-26 NOTE — DISCHARGE SUMMARY
Discharge Summary    CHIEF COMPLAINT ON ADMISSION  Chief Complaint   Patient presents with   • Weakness     weakness, dizziness, weight loss x 2 weeks       Reason for Admission  Weak, Dizzy     Admission Date  10/24/2018    CODE STATUS  Full Code    HPI & HOSPITAL COURSE  This is a 38 y.o. male here with Bloody stools.Patient began having painless rectal bleedingHave a left pelvic and acetabular fracture.  Abdominal pain, nausea, vomiting and diarrhea.Upon arrival there was a concern that patient was unable to care for self and might of been confused.  Weakness and weight loss.  Patient also complained of polyuria and polydipsia as well as dry mouth.  Upon arrival patient was noted to be in mild diabetic ketoacidosis.  This was a new diagnosis as far as diabetes for him.  Patient's glucose was nearly 600.  Patient was placed on Lantus as well as insulin sliding scale, also placed on IV fluids.  Patient quickly had resolution of his DKA.  Patient saw the diabetic educator multiple times, at this point in time he is comfortable with his treatment going forward.  Patient will be placed on Lantus as well as insulin sliding scale.  Currently patient has no complaints and is deemed back to his baseline.       Therefore, he is discharged in good and stable condition to home with close outpatient follow-up.    The patient met 2-midnight criteria for an inpatient stay at the time of discharge.    Discharge Date  10/26/18    FOLLOW UP ITEMS POST DISCHARGE  Follow-up with primary care provider within 1 week  Emergency department or 911 in case of emergency    DISCHARGE DIAGNOSES  Principal Problem (Resolved):    DKA (diabetic ketoacidoses) (HCC) POA: Yes  Active Problems:    * No active hospital problems. *  Resolved Problems:    Hyponatremia POA: Yes    Serum total bilirubin elevated POA: Yes    Dehydration POA: Yes    Hypokalemia POA: Unknown      FOLLOW UP  No future appointments.  No follow-up provider  specified.    MEDICATIONS ON DISCHARGE     Medication List      START taking these medications      Instructions   Alcohol Swabs   Doctor's comments:  Per formulary preference. ICD-10 code: E10.65 - Uncontrolled type 1 Diabetes Mellitus  Wipe site with prep pad prior to injection.     * Blood Glucose Meter Kit   Doctor's comments:  Or per formulary preference. ICD-10 code: E10.65 - Uncontrolled type 1 Diabetes Mellitus  Test blood sugar as recommended by provider. One Touch Ultra Mini blood glucose monitoring kit.     * Blood Glucose Test Strips   Doctor's comments:  Or per formulary preference. ICD-10 code: E10.65 - Uncontrolled type 1 Diabetes Mellitus  Use one One Touch Ultra Mini strip to test blood sugar three times daily before meals.     insulin glargine 100 UNIT/ML Sopn injection  Commonly known as:  LANTUS   Inject 10 Units as instructed every evening for 31 days.  Dose:  10 Units     Insulin Syringes U-100 0.3 mL   Doctor's comments:  Per formulary preference. ICD-10 code: E10.65 - Uncontrolled type 1 Diabetes Mellitus  Use one syringe to inject insulin four times daily.     Lancets   Doctor's comments:  Or per formulary preference. ICD-10 code: E10.65 - Uncontrolled type 1 Diabetes Mellitus  Use one One Touch Ultra Mini lancet to test blood sugar three times daily before meals.     NOVOLOG (insulin aspart) 100 UNIT/ML Sopn injection  Commonly known as:  NOVOLOG   Insulin Sliding Scale:   =0 Units (U), 121-199= 3 U, 200-249=6 U, 250-299=9 U, 300-349=12 U, 350-399=15 U, 400-499=18 U        * This list has 2 medication(s) that are the same as other medications prescribed for you. Read the directions carefully, and ask your doctor or other care provider to review them with you.            CONTINUE taking these medications      Instructions   asa/apap/caffeine 250-250-65 MG Tabs  Commonly known as:  EXCEDRIN   Take 2 Tabs by mouth every 8 hours as needed for Headache.  Dose:  2 Tab             Allergies  No Known Allergies    DIET  Orders Placed This Encounter   Procedures   • Diet Order Diabetic, Consistent Carbohydrate     Standing Status:   Standing     Number of Occurrences:   1     Order Specific Question:   Diet:     Answer:   Diabetic [3]     Order Specific Question:   Diet:     Answer:   Consistent Carbohydrate [4]       ACTIVITY  As tolerated.  Weight bearing as tolerated    CONSULTATIONS  None    PROCEDURES  None    LABORATORY  Lab Results   Component Value Date    SODIUM 132 (L) 10/25/2018    POTASSIUM 3.4 (L) 10/25/2018    CHLORIDE 97 10/25/2018    CO2 28 10/25/2018    GLUCOSE 292 (H) 10/25/2018    BUN 11 10/25/2018    CREATININE 0.84 10/25/2018        Lab Results   Component Value Date    WBC 7.3 10/25/2018    HEMOGLOBIN 12.7 (L) 10/25/2018    HEMATOCRIT 34.8 (L) 10/25/2018    PLATELETCT 212 10/25/2018        Total time of the discharge process exceeds 45 minutes.

## 2018-10-26 NOTE — CARE PLAN
Problem: Communication  Goal: The ability to communicate needs accurately and effectively will improve  Outcome: PROGRESSING AS EXPECTED  Patient communicates needs effectively, voices all feeling/concerns. Using call light appropriately. Plan of care discussed with patient.

## 2018-10-26 NOTE — PROGRESS NOTES
Went over all d/c instructions and medications with pt.all questions answered.  Went over diabetic teaching again with pt,he demonstrates understanding.  Pt chose to walk out to his car.

## 2018-10-26 NOTE — CARE PLAN
Problem: Knowledge Deficit  Goal: Knowledge of disease process/condition, treatment plan, diagnostic tests, and medications will improve    Intervention: Explain information regarding disease process/condition, treatment plan, diagnostic tests, and medications and document in education  Discussed hyper/hypoglcemia, pt able to perform accu checks and give self Insulin injections with proper return demonstration, given hand outs and watched film on activity/exercise with DM. Pt states he is  but short distance comes home nightly, he is open to discussions and eager to manage his DM. He shows much interest in his future health will continue to do 1 on 1 education with pt and wife works full time so with her when she is available. He shows ability to safely manage his meds, diet and accu checks tomorrow should he be discharged.

## 2018-10-30 ENCOUNTER — PATIENT OUTREACH (OUTPATIENT)
Dept: HEALTH INFORMATION MANAGEMENT | Facility: OTHER | Age: 38
End: 2018-10-30

## 2018-10-30 NOTE — PROGRESS NOTES
10/30/18 8:25am: CM post discharge outreach call first attempt.   left requesting return call to  at [phone].

## 2018-11-01 ENCOUNTER — OFFICE VISIT (OUTPATIENT)
Dept: MEDICAL GROUP | Facility: MEDICAL CENTER | Age: 38
End: 2018-11-01
Payer: COMMERCIAL

## 2018-11-01 VITALS
HEART RATE: 77 BPM | WEIGHT: 156 LBS | HEIGHT: 69 IN | DIASTOLIC BLOOD PRESSURE: 60 MMHG | TEMPERATURE: 98 F | RESPIRATION RATE: 14 BRPM | BODY MASS INDEX: 23.11 KG/M2 | SYSTOLIC BLOOD PRESSURE: 108 MMHG | OXYGEN SATURATION: 98 %

## 2018-11-01 DIAGNOSIS — Z09 HOSPITAL DISCHARGE FOLLOW-UP: ICD-10-CM

## 2018-11-01 DIAGNOSIS — H53.8 BLURRED VISION: ICD-10-CM

## 2018-11-01 DIAGNOSIS — E11.9 DIABETES MELLITUS, NEW ONSET (HCC): ICD-10-CM

## 2018-11-01 DIAGNOSIS — E13.10 DIABETIC KETOACIDOSIS WITHOUT COMA ASSOCIATED WITH OTHER SPECIFIED DIABETES MELLITUS (HCC): ICD-10-CM

## 2018-11-01 DIAGNOSIS — E78.5 DYSLIPIDEMIA (HIGH LDL; LOW HDL): ICD-10-CM

## 2018-11-01 DIAGNOSIS — R25.1 SHAKY: ICD-10-CM

## 2018-11-01 LAB — GLUCOSE BLD-MCNC: 168 MG/DL (ref 70–100)

## 2018-11-01 PROCEDURE — 99205 OFFICE O/P NEW HI 60 MIN: CPT | Performed by: FAMILY MEDICINE

## 2018-11-01 PROCEDURE — 82962 GLUCOSE BLOOD TEST: CPT | Performed by: FAMILY MEDICINE

## 2018-11-01 NOTE — PROGRESS NOTES
Subjective:     Jhonatan Ross is a 38 y.o. male who presents for Hospital Follow-up.  Chart and discharge summary reviewed.   Date of discharge 10/26/18.  48- hour post discharge RN call completed on 10/30/18 and documented in the medical record by Connie Collado RN:  POST DISCHARGE CALL:  Discharge Date:10/26/2018   Date of Outreach Call: 10/30/2018  1:37 PM  Now that you're home, how are you doing? Good  Comment:Pt reports he did have a blood sugar reading of  84 and felt shaky. Pt  states he did have some hard candy  and blood sugar came up. Pt reports he was instructed on  treatment of low blood sugar.CM encouraged pt to bring log  of reading to appt.   Do you have questions about your medications? No    Did you fill your medications? Yes    Do you have a follow-up appointment scheduled?Yes  Comment:CM assisted patient with changing appt for a  later appt on Thursday.    Discharging Department: [facility]    Number of Attempts: *  Current or previous attempts completed within two business days of discharge? *  Provided education regarding treatment plan, medication, self-management, ADLs? *  Has patient completed Advance Directive? If yes, advise them to bring to appointment. *  Care Manager phone number provided? *  Is there anything else I can help you with? No        HPI: Recently hospitalized for weakness and dizziness accompanied by polyphagia, polyuria, polydipsia, and unintended weight loss.  He was found to be in mild diabetic ketoacidosis.  His glucose was nearly 600.  Hemoglobin A1c was 15.7.  The patient was treated with Lantus and sliding scale insulin and IV fluids.  He had a limited amount of diabetes education while in the hospital.    Since returning home, patient reports having episodes of shakiness after taking 9 units of Humalog based on his sliding scale.  He checks his blood sugars before eating and a have been running around mid 200s.  He does not always get a chance to  "check his blood sugar after he gives himself insulin and eats.  He was also under the impression that the Lantus was to be taken only as needed.  He took it for the first time last night and his blood sugar this morning was about 160.     Lipid panel was done and showed a total cholesterol 195, triglycerides 283, HDL 26, .    The patient has questions regarding hospitalization or discharge: Patient had many questions about use of his insulin and about proper eating habits  The patient denies weakness; denies difficulty taking care of self at home.    Current Outpatient Prescriptions   Medication Sig Dispense Refill   • NOVOLOG, insulin aspart, (NOVOLOG) 100 UNIT/ML Solution Pen-injector injection Insulin Sliding Scale:   =0 Units (U), 121-199= 3 U, 200-249=6 U, 250-299=9 U, 300-349=12 U, 350-399=15 U, 400-499=18 U 10 PEN 2   • insulin glargine (LANTUS) 100 UNIT/ML Solution Pen-injector injection Inject 10 Units as instructed every evening for 31 days. 6 mL 0   • Blood Glucose Meter Kit Test blood sugar as recommended by provider. One Touch Ultra Mini blood glucose monitoring kit. 1 Kit 0   • Blood Glucose Test Strips Use one One Touch Ultra Mini strip to test blood sugar three times daily before meals. 100 Strip 0   • Lancets Use one One Touch Ultra Mini lancet to test blood sugar three times daily before meals. 100 Each 0   • Alcohol Swabs Wipe site with prep pad prior to injection. 100 Each 0   • Insulin Syringes U-100 0.3 mL Use one syringe to inject insulin four times daily. 100 Each 0   • Insulin Pen Needle (PEN NEEDLES 29GX1/2\") 29G X 12MM Misc 10 Packages by Does not apply route 4 times a day. 120 Each 0   • asa/apap/caffeine (EXCEDRIN) 250-250-65 MG Tab Take 2 Tabs by mouth every 8 hours as needed for Headache.       No current facility-administered medications for this visit.        Allergies:   Patient has no known allergies.    Social History:  Social History   Substance Use Topics   • " "Smoking status: Never Smoker   • Smokeless tobacco: Never Used   • Alcohol use Yes      Comment: rare        Family History:  History reviewed. No pertinent family history.    History reviewed. No pertinent past medical history.    Surgical History  History reviewed. No pertinent surgical history.    ROS:    Constitutional:  Denies fever, chills, night sweats, fatigue or malaise  HENT: Denies head congestion, ear pain or drainage, decreased hearing, sore throat  Eyes: Denies eye drainage or redness, eye pain.  He notes that he has had blurry vision for the last several weeks.  Neck: Denies pain, swollen glands, decreased range of motion  Lungs: Denies shortness of breath, wheezing, cough  Cardiovascular: Denies chest pain, orthopnea, lower extremity edema, palpitations  Abdominal: Denies abdominal pain, change in bowel or bladder habits, nausea or vomiting  Musculoskeletal: Denies back pain, joint pains, decreased range of motion  Endocrine: Denies heat or cold intolerance, hair loss  Neurological: Denies dizziness, headache, confusion, focal weakness or numbness, memory loss  Psychiatric: Denies depression, anxiety, insomnia       Objective:     Ambulatory Vitals  Encounter Vitals  Temperature: 36.7 °C (98 °F)  Blood Pressure: 108/60  Pulse: 77  Respiration: 14  Pulse Oximetry: 98 %  Weight: 70.8 kg (156 lb)  Height: 174 cm (5' 8.5\")  BMI (Calculated): 23.37    Physical Exam:    GEN:  Alert, oriented, in no distress  HEENT:  PERRLA, EOMI  LUNGS:  Clear to auscultation without rales, rhonci, or wheezes.  CV:  Heart RRR without murmurs or S3 or S4  EXT:  Without cyanosis, clubbing, or edema.  NEURO:  Cranial nerves II through XII intact.  Motor function and sensation intact.  SKIN: No rashes or suspicious lesions.  PSYCH:  Behavior is appropriate.    Today:    Component Value Ref Range & Units Status   Glucose - Accu-Ck 168   70 - 100 mg/dL Final           Assessment and Plan:     1. Hospital follow-up: "   Hospitalization and results reviewed with patient. High risk conditions requiring teaching or care coordination were identified and addressed.The patient demonstrate understanding of admission and underlying conditions. The patient understands discharge instructions and when to seek medical attention. Medications reviewed including instructions regarding high risk medications, dosing and side effects.    The patient is able to safely adhere to ADL/IADL, treatment and medication regimen, self-manage of high-risk conditions? Yes  The patient requires physical therapy/home health/DME referral? No   The patient requires referral to care coordination/behavioral health/social work?  No   Patient requires referral for pharmacy consult? No     2. Diabetic ketoacidosis without coma associated with other specified diabetes mellitus (HCC)  -Ketoacidosis resolved    3. Diabetes mellitus, new onset (HCC).  At this point we do not know if this is type I or type II.  -I spent an extensive amount of time discussing diet with him as well as the proper way to take his insulin.  I counseled him that he needs to take the Lantus every day which would allow him to take less NovoLog.  I also instructed him to decrease the sliding scale by taking 1 unit less than instructed.  - REFERRAL TO DIABETIC EDUCATION Diabetes Self Management Education / Training (DSME/T) and Medical Nutrition Therapy (MNT): Initial Group DSME/MNT as authorized by payor, Follow-Up DSME/MNT as authorized by payor; DSME/T Content: Monitoring Diabete...  - REFERRAL TO OPHTHALMOLOGY    4. Shaky  -Most likely he becomes hypoglycemic after the 9 units of insulin.  He says when he eats improved the symptoms subside.  As noted above, he will decrease the sliding scale by 1 unit.    5. Dyslipidemia (high LDL; low HDL)  -We discussed medication management.  He says his eating habits have been horrible and he is changing that now.  He is given a handout and counseling  "regarding a Mediterranean type diet.    6. Blurred vision  -I explained that this is associated with his diabetes being out of control.  This should get better as his blood sugars get under better control but he still needs to see an ophthalmologist.  Referral is placed.        Medication Reconciliation  Medication list at end of encounter:   Current Outpatient Prescriptions   Medication Sig Dispense Refill   • NOVOLOG, insulin aspart, (NOVOLOG) 100 UNIT/ML Solution Pen-injector injection Insulin Sliding Scale:   =0 Units (U), 121-199= 3 U, 200-249=6 U, 250-299=9 U, 300-349=12 U, 350-399=15 U, 400-499=18 U 10 PEN 2   • insulin glargine (LANTUS) 100 UNIT/ML Solution Pen-injector injection Inject 10 Units as instructed every evening for 31 days. 6 mL 0   • Blood Glucose Meter Kit Test blood sugar as recommended by provider. One Touch Ultra Mini blood glucose monitoring kit. 1 Kit 0   • Blood Glucose Test Strips Use one One Touch Ultra Mini strip to test blood sugar three times daily before meals. 100 Strip 0   • Lancets Use one One Touch Ultra Mini lancet to test blood sugar three times daily before meals. 100 Each 0   • Alcohol Swabs Wipe site with prep pad prior to injection. 100 Each 0   • Insulin Syringes U-100 0.3 mL Use one syringe to inject insulin four times daily. 100 Each 0   • Insulin Pen Needle (PEN NEEDLES 29GX1/2\") 29G X 12MM Misc 10 Packages by Does not apply route 4 times a day. 120 Each 0   • asa/apap/caffeine (EXCEDRIN) 250-250-65 MG Tab Take 2 Tabs by mouth every 8 hours as needed for Headache.       No current facility-administered medications for this visit.        Primary care follow-up:    Recommended followup:  With reece Howard M.D.   Future Appointments       Provider Department Center    11/19/2018 8:00 AM Wendy Corbett M.D. [facility] Med - Internal Medicine           Patient Instruction  Patient provided with educational material on discharge diagnosis and " management of symptoms/red flags. Patient instructed to keep follow-up appointments and to bring written questions and and actual medications to each office visit. Patient instructed to call PCP/specialist with any problems/questions/concerns. Patient verbalizes understanding and has no further questions at this time.    Total of 60 minutes face-to-face time was spent with patient, with greater than 50% of the time spent in counseling regarding lifestyle modifications necessary to control his diabetes and dyslipidemia, and coordination of care and referrals for diabetes education and ophthalmology.

## 2018-11-01 NOTE — PATIENT INSTRUCTIONS
If you need further assistance, or have any questions; concerns or lingering symptoms before seeing your Primary Care Provider or specialist.     Do not hesitate to contact us.     Please contact us at the Post-Hospital Follow Up Program at [phone] and ask for the Medical Assistant for Dr Lema.   Our offices hours are Monday-Friday 8 am-5 pm.

## 2018-11-05 ENCOUNTER — TELEPHONE (OUTPATIENT)
Dept: HEALTH INFORMATION MANAGEMENT | Facility: OTHER | Age: 38
End: 2018-11-05

## 2018-11-05 NOTE — TELEPHONE ENCOUNTER
Pt left VM requesting return call.  Cm returned pt call.  Pt has questions regarding referral to Opthalmology appointment.  CM encouraged patient to contact [phone] and speak to representative at referral center. Pt verbalized understanding.

## 2018-11-07 ENCOUNTER — HOSPITAL ENCOUNTER (EMERGENCY)
Facility: MEDICAL CENTER | Age: 38
End: 2018-11-07
Attending: EMERGENCY MEDICINE
Payer: COMMERCIAL

## 2018-11-07 VITALS
TEMPERATURE: 97.3 F | OXYGEN SATURATION: 95 % | RESPIRATION RATE: 16 BRPM | HEART RATE: 55 BPM | WEIGHT: 153 LBS | HEIGHT: 66 IN | DIASTOLIC BLOOD PRESSURE: 71 MMHG | SYSTOLIC BLOOD PRESSURE: 106 MMHG | BODY MASS INDEX: 24.59 KG/M2

## 2018-11-07 DIAGNOSIS — H53.8 BLURRED VISION: ICD-10-CM

## 2018-11-07 LAB — GLUCOSE BLD-MCNC: 208 MG/DL (ref 65–99)

## 2018-11-07 PROCEDURE — 99284 EMERGENCY DEPT VISIT MOD MDM: CPT

## 2018-11-07 PROCEDURE — 82962 GLUCOSE BLOOD TEST: CPT

## 2018-11-07 RX ORDER — INSULIN GLARGINE 100 [IU]/ML
2 INJECTION, SOLUTION SUBCUTANEOUS NIGHTLY
Status: SHIPPED | COMMUNITY
End: 2021-04-23

## 2018-11-07 ASSESSMENT — PAIN SCALES - WONG BAKER: WONGBAKER_NUMERICALRESPONSE: DOESN'T HURT AT ALL

## 2018-11-07 NOTE — ED NOTES
Med Rec Updated and Complete per Pt at bedside  Allergies Reviewed  No PO ABX last 30 days    Pt states he is only taking insulin at this time.

## 2018-11-07 NOTE — DISCHARGE PLANNING
RORO received a call from MOON Hsieh regarding pt needing assistance with disability.  SW met with pt in the waiting room.  Pt stated that he cannot see words close up.  Pt is a  and has taken off work already for the past week.  He was able to schedule a ophathamology appointment next week and he needs some form on income to him while he is not working.  SW asked if he paid into short term disability, pt stated that he does not know.  SW advised him to reach out to his HR to see if he does have short term disability and to find out about FMLA as well.

## 2018-11-07 NOTE — ED TRIAGE NOTES
Pt amb to triage c/o blurred vision x4d. Pt recently diagnosed with diabetes 10-24-18. Pt self-checks blood sugars; last checked at 06am: FSBS 90

## 2018-11-07 NOTE — ED PROVIDER NOTES
ED Provider Note    CHIEF COMPLAINT  Chief Complaint   Patient presents with   • Blurred Vision     x4d       HPI  Jhonatan Ross is a 38 y.o. male who presents to the emergency department the chief complaint of blurry vision.  The patient says that approximately a week ago he noticed that he had blurry vision.  He is able to see fine at a distance but when he tries to read things up close he is unable to read.  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  He denies any eye pain.  No eye redness.  The patient notes that he was recently admitted to the hospital for diabetic ketoacidosis and was started on insulin.  The patient has been taking his medications as prescribed.  He has not had any fevers, chills, nausea or vomiting.  Blood sugars have been in the low 100 range.  The patient contacted his doctor who told him he should see an eye doctor.  Is been trying to get an appointment with an eye doctor but he says that nobody has an appointment until after the beginning of the new year.  Because of this the patient comes in the emergency department.    REVIEW OF SYSTEMS  See HPI for further details. All other systems are negative.     PAST MEDICAL HISTORY  Past Medical History:   Diagnosis Date   • Diabetes (HCC) 10/24/2018    IDDM       FAMILY HISTORY  History reviewed. No pertinent family history.    SOCIAL HISTORY  Social History     Social History   • Marital status:      Spouse name: N/A   • Number of children: N/A   • Years of education: N/A     Social History Main Topics   • Smoking status: Never Smoker   • Smokeless tobacco: Never Used   • Alcohol use Yes      Comment: rare   • Drug use: No   • Sexual activity: Not on file     Other Topics Concern   • Not on file     Social History Narrative   • No narrative on file       SURGICAL HISTORY  History reviewed. No pertinent surgical history.    CURRENT MEDICATIONS  Home Medications      "Reviewed by Brunilda Barker (Pharmacy Tech) on 11/07/18 at 0922  Med List Status: Complete   Medication Last Dose Status   insulin aspart (NOVOLOG) 100 UNIT/ML Solution 11/6/2018 Active   insulin glargine (LANTUS) 100 UNIT/ML Solution 11/6/2018 Active                ALLERGIES  No Known Allergies    PHYSICAL EXAM  VITAL SIGNS: /74   Pulse 74   Temp 36.7 °C (98 °F)   Resp 18   Ht 1.676 m (5' 6\")   Wt 69.4 kg (153 lb)   BMI 24.69 kg/m²   Constitutional: Well developed, Well nourished, No acute distress, Non-toxic appearance.   HENT: Normocephalic, Atraumatic, Bilateral external ears normal, Oropharynx moist, No oral exudates, Nose normal.   Eyes: Pupils are equal round reactive to light.  Extraocular movements are intact.  Anterior chamber is clear.  No photophobia.  Visual acuity as documented in the nursing notes at 20/25 bilaterally.  Visualized retina with the ophthalmoscope is benign.  Neck: Normal range of motion, No tenderness, Supple, No stridor.   Lymphatic: No lymphadenopathy noted.   Cardiovascular: Normal heart rate, Normal rhythm, No murmurs, No rubs, No gallops.   Thorax & Lungs: Normal breath sounds, No respiratory distress, No wheezing, No chest tenderness.   Skin: Warm, Dry, No erythema, No rash.   Extremities: Intact distal pulses, No edema, No tenderness, No cyanosis, No clubbing.       RADIOLOGY/PROCEDURES      COURSE & MEDICAL DECISION MAKING  Pertinent Labs & Imaging studies reviewed. (See chart for details)    Patient presents today with blurry vision.  This seems to be a likely refractive error.  May be related to his diabetes.  I do not see any evidence of eye infection.  No evidence of retinal detachment.  I spoke with the on-call ophthalmologist Dr. Tatum.  He will see the patient in clinic.  Patient is discharged home in stable condition.      FINAL IMPRESSION  1. Blurred vision Active         Electronically signed by: Jose C Lazcano, 11/7/2018 9:37 AM    "

## 2018-11-07 NOTE — ED NOTES
Discharge instructions provided.  Pt verbalized the understanding of discharge instructions to follow up with PCP and to return to ER if condition worsens.  No prescription given. Pt referred to  to apply for disability till he gets an appointment with pathobiologist.  Pt ambulated out of ER without difficulty.

## 2018-11-19 ENCOUNTER — OFFICE VISIT (OUTPATIENT)
Dept: INTERNAL MEDICINE | Facility: MEDICAL CENTER | Age: 38
End: 2018-11-19
Payer: COMMERCIAL

## 2018-11-19 ENCOUNTER — HOSPITAL ENCOUNTER (OUTPATIENT)
Dept: LAB | Facility: MEDICAL CENTER | Age: 38
End: 2018-11-19
Attending: STUDENT IN AN ORGANIZED HEALTH CARE EDUCATION/TRAINING PROGRAM
Payer: COMMERCIAL

## 2018-11-19 VITALS
OXYGEN SATURATION: 97 % | TEMPERATURE: 97 F | DIASTOLIC BLOOD PRESSURE: 52 MMHG | HEART RATE: 64 BPM | WEIGHT: 150 LBS | SYSTOLIC BLOOD PRESSURE: 90 MMHG | HEIGHT: 67 IN | BODY MASS INDEX: 23.54 KG/M2

## 2018-11-19 DIAGNOSIS — E11.9 DIABETES MELLITUS, NEW ONSET (HCC): ICD-10-CM

## 2018-11-19 PROCEDURE — 36415 COLL VENOUS BLD VENIPUNCTURE: CPT

## 2018-11-19 PROCEDURE — 86337 INSULIN ANTIBODIES: CPT

## 2018-11-19 PROCEDURE — 99204 OFFICE O/P NEW MOD 45 MIN: CPT | Mod: GC | Performed by: INTERNAL MEDICINE

## 2018-11-19 PROCEDURE — 83525 ASSAY OF INSULIN: CPT

## 2018-11-19 PROCEDURE — 84681 ASSAY OF C-PEPTIDE: CPT

## 2018-11-19 PROCEDURE — 86341 ISLET CELL ANTIBODY: CPT | Mod: 91

## 2018-11-19 RX ORDER — PEN NEEDLE, DIABETIC 29 G X1/2"
NEEDLE, DISPOSABLE MISCELLANEOUS
Refills: 0 | Status: SHIPPED | COMMUNITY
Start: 2018-10-29 | End: 2021-04-23

## 2018-11-19 RX ORDER — BLOOD-GLUCOSE METER
KIT MISCELLANEOUS
Refills: 0 | Status: SHIPPED | COMMUNITY
Start: 2018-10-26 | End: 2021-04-23

## 2018-11-19 ASSESSMENT — ENCOUNTER SYMPTOMS
PALPITATIONS: 0
CONSTIPATION: 0
VOMITING: 0
HEMOPTYSIS: 0
LOSS OF CONSCIOUSNESS: 0
HEARTBURN: 0
COUGH: 0
BRUISES/BLEEDS EASILY: 0
DIARRHEA: 0
DEPRESSION: 0
TREMORS: 1
SHORTNESS OF BREATH: 0
EYE PAIN: 0
POLYDIPSIA: 1
FEVER: 0
BLURRED VISION: 0
WEIGHT LOSS: 0
MYALGIAS: 0
CHILLS: 0
NAUSEA: 0
WEAKNESS: 0
SEIZURES: 0

## 2018-11-19 ASSESSMENT — LIFESTYLE VARIABLES: SUBSTANCE_ABUSE: 0

## 2018-11-20 NOTE — PROGRESS NOTES
New Patient to Establish    Reason to establish: Hospital follow-up    CC: Hospital follow-up for diabetes mellitus management with hypoglycemic episodes    HPI: Patient was recently (10/24/2018) admitted to the hospital for DKA, after which he was diagnosed with diabetes mellitus and was started on 10 units of Lantus as well as insulin Aspart (patient required 9-12 units).  However, due to hypoglycemic episodes the dosage of Lantus and insulin aspart were reduced.  Eventually patient developed blurry vision and was unable to see near objects or read on his phone which interfered with his work as a .  Patient was referred to an ophthalmologist by his PCP Kate Lema, however as he could not get an appointment for another 3 months he went to the ER on 11/7/2018.  ER doctors  Noted no evidence of infection or retinal detachment, arranged an appointment with ophthalmologist Dr. Tatum and discharged the patient.  Patient stopped taking insulin from 11/7/2018 and his blurry vision completely resolved by the time he saw his ophthalmologist on 11/14/2018.     Patient reports that he has made changes to his diet and has been monitoring his blood glucose levels daily.  He reports that they range from 110-140.  Patient is unwilling to restart insulin and wants to avoid it as far as possible and is requesting for oral antidiabetics if he needs to be on any medication and prefers to control his blood sugar levels with dietary modification, exercise and other lifestyle changes.  Patient has never seen an endocrinologist for the management of his diabetes.    Patient Active Problem List    Diagnosis Date Noted   • Diabetes mellitus, new onset (HCC) 11/01/2018   • Dyslipidemia (high LDL; low HDL) 11/01/2018       Past Medical History:   Diagnosis Date   • Diabetes (HCC) 10/24/2018    IDDM   • Head ache        Current Outpatient Prescriptions   Medication Sig Dispense Refill   • Blood Glucose Monitoring Suppl  (ONE TOUCH ULTRA MINI) w/Device Kit TEST BLOOD SUGAR AS RECOMMENDED BY PROVIDER  0   • ONE TOUCH ULTRA TEST strip TEST BLOOD SUGAR TID BEFORE MEALS  0   • BD ULTRA-FINE PEN NEEDLES 29G X 12.7MM Misc U QID  0   • metFORMIN (GLUCOPHAGE) 500 MG Tab Take 1 Tab by mouth 2 times a day, with meals. 30 Tab 1   • insulin glargine (LANTUS) 100 UNIT/ML Solution Inject 2 Units as instructed every evening.     • insulin aspart (NOVOLOG) 100 UNIT/ML Solution Inject 2-15 Units as instructed 3 times a day before meals.       No current facility-administered medications for this visit.        Allergies as of 11/19/2018   • (No Known Allergies)       Social History     Social History   • Marital status:      Spouse name: N/A   • Number of children: N/A   • Years of education: N/A     Occupational History   • Not on file.     Social History Main Topics   • Smoking status: Never Smoker   • Smokeless tobacco: Never Used   • Alcohol use Yes      Comment: rare   • Drug use: No   • Sexual activity: Not on file     Other Topics Concern   • Not on file     Social History Narrative   • No narrative on file       Family History   Problem Relation Age of Onset   • Diabetes Paternal Grandmother    • Diabetes Paternal Grandfather        History reviewed. No pertinent surgical history.    ROS: As per HPI. Additional pertinent symptoms as noted below.  Review of Systems   Constitutional: Negative for chills, fever and weight loss (Had a weight loss of 40 pounds in 1 week prior to the hospital admission on 10/24/2018 with DKA.  Reports improvement in weight since then).   HENT: Negative for congestion and hearing loss.    Eyes: Negative for blurred vision and pain.   Respiratory: Negative for cough, hemoptysis and shortness of breath.    Cardiovascular: Negative for chest pain and palpitations.   Gastrointestinal: Negative for constipation, diarrhea, heartburn, nausea and vomiting.   Genitourinary: Negative for dysuria, frequency and urgency.  "  Musculoskeletal: Negative for joint pain and myalgias.   Skin: Negative for itching and rash.   Neurological: Positive for tremors (Occasionally, when he was on insulin). Negative for seizures, loss of consciousness and weakness.   Endo/Heme/Allergies: Positive for polydipsia. Does not bruise/bleed easily.   Psychiatric/Behavioral: Negative for depression, substance abuse and suicidal ideas.         BP (!) 90/52 (BP Location: Right arm, Patient Position: Sitting, BP Cuff Size: Adult)   Pulse 64   Temp 36.1 °C (97 °F) (Temporal)   Ht 1.708 m (5' 7.25\")   Wt 68 kg (150 lb)   SpO2 97%   BMI 23.32 kg/m²     Physical Exam   Constitutional: He is oriented to person, place, and time. No distress.   HENT:   Head: Normocephalic and atraumatic.   Mouth/Throat: Oropharynx is clear and moist. No oropharyngeal exudate.   Eyes: Pupils are equal, round, and reactive to light. Conjunctivae and EOM are normal. No scleral icterus.   Neck: No thyromegaly present.   Cardiovascular: Normal rate, regular rhythm and normal heart sounds.    No murmur heard.  Pulmonary/Chest: Breath sounds normal. He has no wheezes.   Abdominal: Soft. Bowel sounds are normal. He exhibits no distension. There is no tenderness.   Musculoskeletal: He exhibits no edema or tenderness.   Lymphadenopathy:     He has no cervical adenopathy.   Neurological: He is alert and oriented to person, place, and time. No cranial nerve deficit.   Skin: No rash noted. No erythema.   Psychiatric: Mood, affect and judgment normal.       Note: I have reviewed all pertinent labs and diagnostic tests associated with this visit with specific comments listed under the assessment and plan below    Assessment and Plan    1. Diabetes mellitus, new onset (HCC)  -Patient was diagnosed with diabetes mellitus (unknown type) when he was admitted to the hospital for DKA.   -Patient was started on 10 units of Lantus as well as sliding scale insulin (patient requiring 9- 12 units) " however had several hypoglycemic episodes & the dosage of Lantus as well as insulin aspart was reduced.   - Patient stopped taking insulin and reports improvement in blurred vision.   -  GAD65 AND INSULIN AB W/RFLX TO IA-2 AB, INSULIN AND C-PEPTIDE to establish type of Diabetes Mellitus.   - Metformin 500 MG Tab  2 times a day, with meals until Diabetes Mellitus type established; If Type II will continue patient on oral antidiabetics;   - Educated the patient that he would need to be on Insulin if he is diagnosed with Type I DM and will need to be referred to an endocrinologist given his symptoms of blurry vision associated with insulin and patient is agreeable with this plan   - nutritional services referral in place, encouraged patient to make the appointment.      Will call back patient with results.   Followup: Return in about 5 weeks (around 12/24/2018) for Short.    Signed by: Wendy Corbett M.D.

## 2018-11-21 LAB
C PEPTIDE SERPL-MCNC: 3.2 NG/ML (ref 0.8–3.5)
INSULIN P FAST SERPL-ACNC: 11 UIU/ML (ref 3–19)

## 2018-11-23 LAB
GAD65 AB SER IA-ACNC: <5 IU/ML (ref 0–5)
INSULIN HUMAN AB SER-ACNC: <0.4 U/ML (ref 0–0.4)

## 2018-11-24 LAB — ISLET CELL512 AB SER-ACNC: <0.8 U/ML (ref 0–0.8)

## 2018-11-28 DIAGNOSIS — E13.9 OTHER SPECIFIED DIABETES MELLITUS WITHOUT COMPLICATION, WITHOUT LONG-TERM CURRENT USE OF INSULIN (HCC): ICD-10-CM

## 2018-11-29 DIAGNOSIS — E13.9 OTHER SPECIFIED DIABETES MELLITUS WITHOUT COMPLICATION, WITHOUT LONG-TERM CURRENT USE OF INSULIN (HCC): ICD-10-CM

## 2018-11-29 NOTE — TELEPHONE ENCOUNTER
Last seen: 11/19/18 by Dr. Corbett  Next appt: None    Was the patient seen in the last year in this department? Yes   Does patient have an active prescription for medications requested? No   Received Request Via: Patient

## 2019-06-07 NOTE — TELEPHONE ENCOUNTER
Last seen: 11/19/18 by Dr. Corbett  Next appt: None    Was the patient seen in the last year in this department? Yes   Does patient have an active prescription for medications requested? No   Received Request Via: Pharmacy

## 2021-04-03 ENCOUNTER — NURSE TRIAGE (OUTPATIENT)
Dept: HEALTH INFORMATION MANAGEMENT | Facility: OTHER | Age: 41
End: 2021-04-03

## 2021-04-03 ENCOUNTER — TELEPHONE (OUTPATIENT)
Dept: SCHEDULING | Facility: IMAGING CENTER | Age: 41
End: 2021-04-03

## 2021-04-03 NOTE — TELEPHONE ENCOUNTER
Reason for Disposition  • Message left on unidentified voice mail. Phone number verified.    Protocols used: NO CONTACT OR DUPLICATE CONTACT CALL-A-OH

## 2021-04-03 NOTE — TELEPHONE ENCOUNTER
Returned call there was no answer. Left message to call back Nurse Advice line.     Regarding: blood sugar spikes  ----- Message from Sneha Mitchell sent at 4/3/2021  9:27 AM PDT -----  Blood sugar spikes

## 2021-04-12 ENCOUNTER — APPOINTMENT (OUTPATIENT)
Dept: MEDICAL GROUP | Facility: MEDICAL CENTER | Age: 41
End: 2021-04-12
Payer: COMMERCIAL

## 2021-04-23 ENCOUNTER — OFFICE VISIT (OUTPATIENT)
Dept: MEDICAL GROUP | Facility: MEDICAL CENTER | Age: 41
End: 2021-04-23
Payer: COMMERCIAL

## 2021-04-23 VITALS
SYSTOLIC BLOOD PRESSURE: 102 MMHG | OXYGEN SATURATION: 93 % | WEIGHT: 154.32 LBS | HEIGHT: 66 IN | RESPIRATION RATE: 16 BRPM | BODY MASS INDEX: 24.8 KG/M2 | HEART RATE: 63 BPM | TEMPERATURE: 98.9 F | DIASTOLIC BLOOD PRESSURE: 67 MMHG

## 2021-04-23 DIAGNOSIS — E78.5 DYSLIPIDEMIA (HIGH LDL; LOW HDL): ICD-10-CM

## 2021-04-23 DIAGNOSIS — E11.9 TYPE 2 DIABETES MELLITUS WITHOUT COMPLICATION, WITHOUT LONG-TERM CURRENT USE OF INSULIN (HCC): ICD-10-CM

## 2021-04-23 LAB — HBA1C MFR BLD: 8.1 % (ref 0–5.6)

## 2021-04-23 PROCEDURE — 99214 OFFICE O/P EST MOD 30 MIN: CPT | Performed by: FAMILY MEDICINE

## 2021-04-23 RX ORDER — ATORVASTATIN CALCIUM 20 MG/1
20 TABLET, FILM COATED ORAL DAILY
Qty: 90 TABLET | Refills: 3 | Status: SHIPPED | OUTPATIENT
Start: 2021-04-23 | End: 2022-01-18

## 2021-04-23 ASSESSMENT — ENCOUNTER SYMPTOMS
PALPITATIONS: 0
WHEEZING: 0
VOMITING: 0
SENSORY CHANGE: 0
CONSTIPATION: 0
NERVOUS/ANXIOUS: 0
CHILLS: 0
FOCAL WEAKNESS: 0
HEMOPTYSIS: 0
DIZZINESS: 0
NAUSEA: 0
SINUS PAIN: 0
SHORTNESS OF BREATH: 0
HEADACHES: 0
ABDOMINAL PAIN: 0
FEVER: 0
MYALGIAS: 0
DIARRHEA: 0
SPUTUM PRODUCTION: 0
DEPRESSION: 0
COUGH: 0

## 2021-04-23 ASSESSMENT — PATIENT HEALTH QUESTIONNAIRE - PHQ9: CLINICAL INTERPRETATION OF PHQ2 SCORE: 0

## 2021-04-23 NOTE — LETTER
[facility]  Franck Lopes M.D.  [address]  [place] [address]  Fax: [phone]   Authorization for Release/Disclosure of   Protected Health Information   Name: JHONATAN MACIAS : 1980 SSN: [id number]   Address: [address] [place] [address] Phone:    [phone] (home)    I authorize the entity listed below to release/disclose the PHI below to:   [facility]/Franck Lopes M.D. and Franck Lopes M.D.   Provider or Entity Name:     Address   City, State, [facility]   Phone:      Fax:     Reason for request: continuity of care   Information to be released:    [  ] LAST COLONOSCOPY,  including any PATH REPORT and follow-up  [  ] LAST FIT/COLOGUARD RESULT [  ] LAST DEXA  [  ] LAST MAMMOGRAM  [  ] LAST PAP  [  ] LAST LABS [  ] RETINA EXAM REPORT  [  ] IMMUNIZATION RECORDS  [  ] Release all info      [  ] Check here and initial the line next to each item to release ALL health information INCLUDING  _____ Care and treatment for drug and / or alcohol abuse  _____ HIV testing, infection status, or AIDS  _____ Genetic Testing    DATES OF SERVICE OR TIME PERIOD TO BE DISCLOSED: _____________  I understand and acknowledge that:  * This Authorization may be revoked at any time by you in writing, except if your health information has already been used or disclosed.  * Your health information that will be used or disclosed as a result of you signing this authorization could be re-disclosed by the recipient. If this occurs, your re-disclosed health information may no longer be protected by State or Federal laws.  * You may refuse to sign this Authorization. Your refusal will not affect your ability to obtain treatment.  * This Authorization becomes effective upon signing and will  on (date) __________.      If no date is indicated, this Authorization will  one (1) year from the signature date.    Name: Jhonatan Macias    Signature:   Date:     2021       PLEASE FAX  REQUESTED RECORDS BACK TO: [phone]

## 2021-04-23 NOTE — PROGRESS NOTES
FAMILY MEDICINE VISIT                                                               Chief complaint::Diagnoses of Dyslipidemia (high LDL; low HDL) and Type 2 diabetes mellitus without complication, without long-term current use of insulin (HCC) were pertinent to this visit.    History of present illness: Jhonatan Ross is a 40 y.o. male who presented to establish care, diabetes.    Type 2 diabetes mellitus without complication, without long-term current use of insulin (HCC)  This is a chronic health problem for this patient. He was diagnosed with diabetes 2 years ago.  He was placed on Metformin 500 mg twice daily and was on insulin.  He reports that he has been taking Metformin but not insulin.  He ran out of Metformin 3 months ago and has been on insulin.  Reports that his blood sugars has been in 200 range.  Work-up was ordered previously to differentiate between type 2 diabetes mellitus and type 1 diabetes mellitus.  Work-up came back negative.  He follows with eye doctor for eye exam.  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  He is not taking any medication.  His previous lipid panel showed elevated total cholesterol, LDL and low HDL.    Lab Results   Component Value Date/Time    HBA1C 15.7 (H) 10/24/2018 03:00 PM          Review of systems:     Review of Systems   Constitutional: Negative for chills, fever and malaise/fatigue.   HENT: Negative for ear discharge, ear pain, hearing loss and sinus pain.    Respiratory: Negative for cough, hemoptysis, sputum production, shortness of breath and wheezing.    Cardiovascular: Negative for chest pain, palpitations and leg swelling.   Gastrointestinal: Negative for abdominal pain, constipation, diarrhea, nausea and vomiting.   Genitourinary: Negative for dysuria, frequency and urgency.   Musculoskeletal: Negative for joint pain and myalgias.   Skin: Negative for itching and rash.   Neurological:  "Negative for dizziness, sensory change, focal weakness and headaches.   Psychiatric/Behavioral: Negative for depression. The patient is not nervous/anxious.         Past Medical, Surgical and Family History:    Past Medical History:   Diagnosis Date   • Diabetes ([facility]) 10/24/2018    IDDM   • Head ache      History reviewed. No pertinent surgical history.  Family History   Problem Relation Age of Onset   • Diabetes Paternal Grandmother    • Diabetes Paternal Grandfather    • Diabetes Father         Social History:    Social History     Tobacco Use   • Smoking status: Never Smoker   • Smokeless tobacco: Never Used   Substance Use Topics   • Alcohol use: Yes     Comment: rare   • Drug use: No        Medications and Allergies:     Current Outpatient Medications   Medication Sig Dispense Refill   • metFORMIN (GLUCOPHAGE) 1000 MG tablet Take 1 tablet by mouth 2 times a day with meals. 180 tablet 3   • atorvastatin (LIPITOR) 20 MG Tab Take 1 tablet by mouth every day. 90 tablet 3   • ONE TOUCH ULTRA TEST strip TEST THREE TIMES DAILY 300 Strip 0     No current facility-administered medications for this visit.        No Known Allergies    Vitals:    /67 (BP Location: Left arm, Patient Position: Sitting, BP Cuff Size: Adult)   Pulse 63   Temp 37.2 °C (98.9 °F)   Resp 16   Ht 1.676 m (5' 6\")   Wt 70 kg (154 lb 5.2 oz)   SpO2 93%  Body mass index is 24.91 kg/m².    Physical Exam:     Physical Exam   Constitutional: He is well-developed, well-nourished, and in no distress. No distress.   HENT:   Head: Normocephalic and atraumatic.   Eyes: Conjunctivae are normal.   Cardiovascular: Normal rate, regular rhythm and normal heart sounds. Exam reveals no gallop and no friction rub.   No murmur heard.  Pulmonary/Chest: Effort normal and breath sounds normal. No respiratory distress. He has no wheezes. He has no rales.   Musculoskeletal:         General: No deformity or edema.      Cervical back: Neck supple.   Neurological: " He is alert. Gait normal.   Skin: No rash noted.   Psychiatric: Mood, affect and judgment normal.        Monofilament testing with a 10 gram force: sensation intact: intact bilaterally  Visual Inspection: Feet without maceration, ulcers, fissures.  Pedal pulses: intact bilaterally     Assessment/Plan:    1. Dyslipidemia (high LDL; low HDL)  Chronic problem, uncontrolled, start Lipitor 20 mg once daily.  Recheck lipid panel.    - Lipid Profile; Future  - atorvastatin (LIPITOR) 20 MG Tab; Take 1 tablet by mouth every day.  Dispense: 90 tablet; Refill: 3    2. Type 2 diabetes mellitus without complication, without long-term current use of insulin (HCC)  Chronic problem, point-of-care A1c came back at 8.1, uncontrolled, goal A1c less than 7.  Increase Metformin to 1000 mg twice daily.  Stop insulin.  Foot exam done today, was normal.  Check labs in 3 months.  Get records from ophthalmologist regarding eye exam.    - CBC WITHOUT DIFFERENTIAL; Future  - Comp Metabolic Panel; Future  - TSH WITH REFLEX TO FT4; Future  - MICROALBUMIN CREAT RATIO URINE; Future  - VITAMIN B12; Future  - HEMOGLOBIN A1C; Future  - Diabetic Monofilament Lower Extremity Exam  - metFORMIN (GLUCOPHAGE) 1000 MG tablet; Take 1 tablet by mouth 2 times a day with meals.  Dispense: 180 tablet; Refill: 3     Please note that this dictation was created using voice recognition software. I have made every reasonable attempt to correct obvious errors, but I expect that there are errors of grammar and possibly content that I did not discover before finalizing the note.    Follow up in 3 months for lab follow-up.

## 2021-04-23 NOTE — ASSESSMENT & PLAN NOTE
This is a chronic health problem for this patient. He was diagnosed with diabetes 2 years ago.  He was placed on Metformin 500 mg twice daily and was on insulin.  He reports that he has been taking Metformin but not insulin.  He ran out of Metformin 3 months ago and has been on insulin.  Reports that his blood sugars has been in 200 range.  Work-up was ordered previously to differentiate between type 2 diabetes mellitus and type 1 diabetes mellitus.  Work-up came back negative.  He follows with eye doctor for eye exam.  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

## 2021-04-23 NOTE — ASSESSMENT & PLAN NOTE
This is a chronic health problem for this patient.  He is not taking any medication.  His previous lipid panel showed elevated total cholesterol, LDL and low HDL.    Lab Results   Component Value Date/Time    HBA1C 15.7 (H) 10/24/2018 03:00 PM

## 2021-07-22 ENCOUNTER — TELEPHONE (OUTPATIENT)
Dept: MEDICAL GROUP | Facility: MEDICAL CENTER | Age: 41
End: 2021-07-22

## 2021-07-22 NOTE — TELEPHONE ENCOUNTER
Phone Number Called: [phone]    Call outcome: Left detailed message for patient. Informed to call back with any additional questions.    Message: Reminding of apt

## 2021-07-23 ENCOUNTER — APPOINTMENT (OUTPATIENT)
Dept: MEDICAL GROUP | Facility: MEDICAL CENTER | Age: 41
End: 2021-07-23
Payer: COMMERCIAL

## 2021-07-30 ENCOUNTER — HOSPITAL ENCOUNTER (OUTPATIENT)
Dept: LAB | Facility: MEDICAL CENTER | Age: 41
End: 2021-07-30
Attending: FAMILY MEDICINE
Payer: COMMERCIAL

## 2021-07-30 DIAGNOSIS — E78.5 DYSLIPIDEMIA (HIGH LDL; LOW HDL): ICD-10-CM

## 2021-07-30 DIAGNOSIS — E11.9 TYPE 2 DIABETES MELLITUS WITHOUT COMPLICATION, WITHOUT LONG-TERM CURRENT USE OF INSULIN (HCC): ICD-10-CM

## 2021-07-30 LAB
ALBUMIN SERPL BCP-MCNC: 4.5 G/DL (ref 3.2–4.9)
ALBUMIN/GLOB SERPL: 1.6 G/DL
ALP SERPL-CCNC: 98 U/L (ref 30–99)
ALT SERPL-CCNC: 19 U/L (ref 2–50)
ANION GAP SERPL CALC-SCNC: 9 MMOL/L (ref 7–16)
AST SERPL-CCNC: 23 U/L (ref 12–45)
BILIRUB SERPL-MCNC: 0.7 MG/DL (ref 0.1–1.5)
BUN SERPL-MCNC: 16 MG/DL (ref 8–22)
CALCIUM SERPL-MCNC: 9.2 MG/DL (ref 8.5–10.5)
CHLORIDE SERPL-SCNC: 101 MMOL/L (ref 96–112)
CHOLEST SERPL-MCNC: 126 MG/DL (ref 100–199)
CO2 SERPL-SCNC: 28 MMOL/L (ref 20–33)
CREAT SERPL-MCNC: 0.92 MG/DL (ref 0.5–1.4)
CREAT UR-MCNC: 174.99 MG/DL
ERYTHROCYTE [DISTWIDTH] IN BLOOD BY AUTOMATED COUNT: 39.1 FL (ref 35.9–50)
EST. AVERAGE GLUCOSE BLD GHB EST-MCNC: 137 MG/DL
GLOBULIN SER CALC-MCNC: 2.9 G/DL (ref 1.9–3.5)
GLUCOSE SERPL-MCNC: 123 MG/DL (ref 65–99)
HBA1C MFR BLD: 6.4 % (ref 4–5.6)
HCT VFR BLD AUTO: 46.7 % (ref 42–52)
HDLC SERPL-MCNC: 41 MG/DL
HGB BLD-MCNC: 16 G/DL (ref 14–18)
LDLC SERPL CALC-MCNC: 63 MG/DL
MCH RBC QN AUTO: 30.9 PG (ref 27–33)
MCHC RBC AUTO-ENTMCNC: 34.3 G/DL (ref 33.7–35.3)
MCV RBC AUTO: 90.3 FL (ref 81.4–97.8)
MICROALBUMIN UR-MCNC: <1.2 MG/DL
MICROALBUMIN/CREAT UR: NORMAL MG/G (ref 0–30)
PLATELET # BLD AUTO: 229 K/UL (ref 164–446)
PMV BLD AUTO: 9.8 FL (ref 9–12.9)
POTASSIUM SERPL-SCNC: 4.4 MMOL/L (ref 3.6–5.5)
PROT SERPL-MCNC: 7.4 G/DL (ref 6–8.2)
RBC # BLD AUTO: 5.17 M/UL (ref 4.7–6.1)
SODIUM SERPL-SCNC: 138 MMOL/L (ref 135–145)
TRIGL SERPL-MCNC: 109 MG/DL (ref 0–149)
TSH SERPL DL<=0.005 MIU/L-ACNC: 1.43 UIU/ML (ref 0.38–5.33)
VIT B12 SERPL-MCNC: 446 PG/ML (ref 211–911)
WBC # BLD AUTO: 7.4 K/UL (ref 4.8–10.8)

## 2021-07-30 PROCEDURE — 80053 COMPREHEN METABOLIC PANEL: CPT

## 2021-07-30 PROCEDURE — 82043 UR ALBUMIN QUANTITATIVE: CPT

## 2021-07-30 PROCEDURE — 82570 ASSAY OF URINE CREATININE: CPT

## 2021-07-30 PROCEDURE — 82607 VITAMIN B-12: CPT

## 2021-07-30 PROCEDURE — 36415 COLL VENOUS BLD VENIPUNCTURE: CPT

## 2021-07-30 PROCEDURE — 83036 HEMOGLOBIN GLYCOSYLATED A1C: CPT

## 2021-07-30 PROCEDURE — 84443 ASSAY THYROID STIM HORMONE: CPT

## 2021-07-30 PROCEDURE — 80061 LIPID PANEL: CPT

## 2021-07-30 PROCEDURE — 85027 COMPLETE CBC AUTOMATED: CPT

## 2021-08-05 ENCOUNTER — TELEPHONE (OUTPATIENT)
Dept: MEDICAL GROUP | Facility: MEDICAL CENTER | Age: 41
End: 2021-08-05

## 2021-08-05 NOTE — TELEPHONE ENCOUNTER
Phone Number Called: [phone]    Call outcome: Left detailed message for patient. Informed to call back with any additional questions.    Message: Reminding of apt

## 2021-08-06 ENCOUNTER — OFFICE VISIT (OUTPATIENT)
Dept: MEDICAL GROUP | Facility: MEDICAL CENTER | Age: 41
End: 2021-08-06
Payer: COMMERCIAL

## 2021-08-06 VITALS
HEART RATE: 55 BPM | DIASTOLIC BLOOD PRESSURE: 62 MMHG | HEIGHT: 66 IN | SYSTOLIC BLOOD PRESSURE: 110 MMHG | BODY MASS INDEX: 24.8 KG/M2 | WEIGHT: 154.32 LBS | TEMPERATURE: 97.5 F | RESPIRATION RATE: 16 BRPM | OXYGEN SATURATION: 98 %

## 2021-08-06 DIAGNOSIS — E11.9 TYPE 2 DIABETES MELLITUS WITHOUT COMPLICATION, WITHOUT LONG-TERM CURRENT USE OF INSULIN (HCC): ICD-10-CM

## 2021-08-06 DIAGNOSIS — Z23 NEED FOR VACCINATION: ICD-10-CM

## 2021-08-06 DIAGNOSIS — E78.5 DYSLIPIDEMIA (HIGH LDL; LOW HDL): ICD-10-CM

## 2021-08-06 DIAGNOSIS — R53.83 OTHER FATIGUE: ICD-10-CM

## 2021-08-06 PROCEDURE — 90471 IMMUNIZATION ADMIN: CPT | Performed by: FAMILY MEDICINE

## 2021-08-06 PROCEDURE — 90732 PPSV23 VACC 2 YRS+ SUBQ/IM: CPT | Performed by: FAMILY MEDICINE

## 2021-08-06 PROCEDURE — 99214 OFFICE O/P EST MOD 30 MIN: CPT | Mod: 25 | Performed by: FAMILY MEDICINE

## 2021-08-06 ASSESSMENT — ENCOUNTER SYMPTOMS
COUGH: 0
SHORTNESS OF BREATH: 0
CHILLS: 0
WHEEZING: 0
PALPITATIONS: 0
FEVER: 0

## 2021-08-06 ASSESSMENT — FIBROSIS 4 INDEX: FIB4 SCORE: 0.88

## 2021-08-06 NOTE — PROGRESS NOTES
FAMILY MEDICINE VISIT                                                               Chief complaint::Diagnoses of Type 2 diabetes mellitus without complication, without long-term current use of insulin (HCC), Dyslipidemia (high LDL; low HDL), Other fatigue, and Need for vaccination were pertinent to this visit.    History of present illness: Jhonatan Ross is a 40 y.o. male who presented for lab follow-up.    He is on Metformin 1000 mg twice daily for type 2 diabetes mellitus.  His recent A1c came back at 6.4 with significantly improved.  He is on atorvastatin 20 mg daily for cholesterol levels and cholesterol levels significantly improved.  Reports that he has been feeling fatigued and tired after taking Metformin.  Wondering that if his blood sugars are going low.  Does not have any other symptoms.      Review of systems:     Review of Systems   Constitutional: Positive for malaise/fatigue. Negative for chills and fever.   Respiratory: Negative for cough, shortness of breath and wheezing.    Cardiovascular: Negative for chest pain, palpitations and leg swelling.        Past Medical, Surgical and Family History:    Past Medical History:   Diagnosis Date   • Diabetes ([facility]) 10/24/2018    IDDM   • Head ache      History reviewed. No pertinent surgical history.  Family History   Problem Relation Age of Onset   • Diabetes Paternal Grandmother    • Diabetes Paternal Grandfather    • Diabetes Father         Social History:    Social History     Tobacco Use   • Smoking status: Never Smoker   • Smokeless tobacco: Never Used   Substance Use Topics   • Alcohol use: Yes     Comment: rare   • Drug use: No        Medications and Allergies:     Current Outpatient Medications   Medication Sig Dispense Refill   • metFORMIN (GLUCOPHAGE) 1000 MG tablet Take 1 tablet by mouth 2 times a day with meals. 180 tablet 3   • atorvastatin (LIPITOR) 20 MG Tab Take 1 tablet by mouth every day. 90 tablet 3   • ONE TOUCH ULTRA TEST strip TEST  "THREE TIMES DAILY 300 Strip 0     No current facility-administered medications for this visit.        No Known Allergies    Vitals:    /62 (BP Location: Left arm, Patient Position: Sitting, BP Cuff Size: Adult)   Pulse (!) 55   Temp 36.4 °C (97.5 °F)   Resp 16   Ht 1.676 m (5' 6\")   Wt 70 kg (154 lb 5.2 oz)   SpO2 98%  Body mass index is 24.91 kg/m².    Physical Exam:     Physical Exam  Constitutional:       General: He is not in acute distress.  HENT:      Head: Normocephalic and atraumatic.   Eyes:      Conjunctiva/sclera: Conjunctivae normal.   Cardiovascular:      Rate and Rhythm: Normal rate.   Pulmonary:      Effort: Pulmonary effort is normal. No respiratory distress.   Musculoskeletal:         General: No deformity.      Cervical back: Neck supple.   Skin:     Findings: No rash.   Neurological:      Mental Status: He is alert.      Gait: Gait is intact.   Psychiatric:         Mood and Affect: Mood and affect normal.         Judgment: Judgment normal.          Labs:  I reviewed with patient recent labs CBC, microalbumin creatinine ratio, CMP, lipid panel, TSH, vitamin B12, A1c resulted on 7/30/2021.    Assessment/Plan:    1. Type 2 diabetes mellitus without complication, without long-term current use of insulin (HCC)  Chronic health problem, significantly improved.  Continue same medication regimen.  Eat healthy diet and do aerobic exercise 150 minutes weekly.  Repeat labs in 6-month.  Up-to-date for foot exam.  Reports that he had an eye exam done, request records.    - Comp Metabolic Panel; Future  - HEMOGLOBIN A1C; Future    2. Dyslipidemia (high LDL; low HDL)  Chronic health problem, significantly improved.  Continue atorvastatin 40 mg once daily.  Eat healthy diet and do aerobic exercise 150 minutes in a week.    3. Other fatigue  New health problem, uncontrolled.  He does not have any other symptoms.  I think it could be likely due to vitamin deficiency as he is on Metformin.  Recommended to " take vitamin B12 1000 mcg daily and vitamin D 2000-units daily.  Check vitamin B12 and vitamin D levels.    - VITAMIN B12; Future  - VITAMIN D,25 HYDROXY; Future    4. Need for vaccination  Pneumovax given today.  - PneumoVax PPV23 =>5yo      Please note that this dictation was created using voice recognition software. I have made every reasonable attempt to correct obvious errors, but I expect that there are errors of grammar and possibly content that I did not discover before finalizing the note.    Follow up in 6 months for lab follow-up.

## 2021-08-06 NOTE — LETTER
[facility]  Franck Lopes M.D.  [address]  [place] [id number]  Fax: [phone]   Authorization for Release/Disclosure of   Protected Health Information   Name: JHONATAN MACIAS : 1980 SSN: [id number]   Address: [address] [place] [address] Phone:    [phone] (home)    I authorize the entity listed below to release/disclose the PHI below to:   [facility]/Franck Lopes M.D. and Franck Lopes M.D.   Provider or Entity Name:     Address   City, State, [facility]   Phone:      Fax:     Reason for request: continuity of care   Information to be released:    [  ] LAST COLONOSCOPY,  including any PATH REPORT and follow-up  [  ] LAST FIT/COLOGUARD RESULT [  ] LAST DEXA  [  ] LAST MAMMOGRAM  [  ] LAST PAP  [  ] LAST LABS [  ] RETINA EXAM REPORT  [  ] IMMUNIZATION RECORDS  [  ] Release all info      [  ] Check here and initial the line next to each item to release ALL health information INCLUDING  _____ Care and treatment for drug and / or alcohol abuse  _____ HIV testing, infection status, or AIDS  _____ Genetic Testing    DATES OF SERVICE OR TIME PERIOD TO BE DISCLOSED: _____________  I understand and acknowledge that:  * This Authorization may be revoked at any time by you in writing, except if your health information has already been used or disclosed.  * Your health information that will be used or disclosed as a result of you signing this authorization could be re-disclosed by the recipient. If this occurs, your re-disclosed health information may no longer be protected by State or Federal laws.  * You may refuse to sign this Authorization. Your refusal will not affect your ability to obtain treatment.  * This Authorization becomes effective upon signing and will  on (date) __________.      If no date is indicated, this Authorization will  one (1) year from the signature date.    Name: Jhonatan Macias    Signature:   Date:     2021       PLEASE FAX  REQUESTED RECORDS BACK TO: [phone]

## 2022-01-18 DIAGNOSIS — E78.5 DYSLIPIDEMIA (HIGH LDL; LOW HDL): ICD-10-CM

## 2022-01-18 DIAGNOSIS — E11.9 TYPE 2 DIABETES MELLITUS WITHOUT COMPLICATION, WITHOUT LONG-TERM CURRENT USE OF INSULIN (HCC): ICD-10-CM

## 2022-01-18 RX ORDER — ATORVASTATIN CALCIUM 20 MG/1
20 TABLET, FILM COATED ORAL DAILY
Qty: 90 TABLET | Refills: 1 | Status: SHIPPED | OUTPATIENT
Start: 2022-01-18 | End: 2022-05-27

## 2022-02-11 ENCOUNTER — OFFICE VISIT (OUTPATIENT)
Dept: MEDICAL GROUP | Facility: MEDICAL CENTER | Age: 42
End: 2022-02-11
Payer: COMMERCIAL

## 2022-02-11 VITALS
HEART RATE: 75 BPM | SYSTOLIC BLOOD PRESSURE: 120 MMHG | DIASTOLIC BLOOD PRESSURE: 62 MMHG | HEIGHT: 66 IN | RESPIRATION RATE: 16 BRPM | TEMPERATURE: 97.4 F | BODY MASS INDEX: 23.38 KG/M2 | OXYGEN SATURATION: 97 % | WEIGHT: 145.5 LBS

## 2022-02-11 DIAGNOSIS — E11.65 TYPE 2 DIABETES MELLITUS WITH HYPERGLYCEMIA, WITHOUT LONG-TERM CURRENT USE OF INSULIN (HCC): ICD-10-CM

## 2022-02-11 DIAGNOSIS — E78.5 DYSLIPIDEMIA (HIGH LDL; LOW HDL): ICD-10-CM

## 2022-02-11 DIAGNOSIS — Z13.21 ENCOUNTER FOR VITAMIN DEFICIENCY SCREENING: ICD-10-CM

## 2022-02-11 LAB
HBA1C MFR BLD: 10.1 % (ref 0–5.6)
INT CON NEG: NEGATIVE
INT CON POS: POSITIVE
RETINAL SCREEN: NORMAL

## 2022-02-11 PROCEDURE — 83036 HEMOGLOBIN GLYCOSYLATED A1C: CPT | Performed by: FAMILY MEDICINE

## 2022-02-11 PROCEDURE — 99214 OFFICE O/P EST MOD 30 MIN: CPT | Performed by: FAMILY MEDICINE

## 2022-02-11 PROCEDURE — 92250 FUNDUS PHOTOGRAPHY W/I&R: CPT | Mod: TC | Performed by: FAMILY MEDICINE

## 2022-02-11 RX ORDER — DULAGLUTIDE 0.75 MG/.5ML
0.5 INJECTION, SOLUTION SUBCUTANEOUS
Qty: 3 EACH | Refills: 1 | Status: SHIPPED | OUTPATIENT
Start: 2022-02-11 | End: 2022-04-08

## 2022-02-11 ASSESSMENT — PATIENT HEALTH QUESTIONNAIRE - PHQ9: CLINICAL INTERPRETATION OF PHQ2 SCORE: 0

## 2022-02-11 ASSESSMENT — ENCOUNTER SYMPTOMS
COUGH: 0
PALPITATIONS: 0
WHEEZING: 0
CHILLS: 0
FEVER: 0

## 2022-02-11 ASSESSMENT — FIBROSIS 4 INDEX: FIB4 SCORE: 0.9

## 2022-02-11 NOTE — ASSESSMENT & PLAN NOTE
Chronic health problem, stable based on last labs.  Recheck lipid panel to assess control.  Continue Lipitor 20 mg daily.

## 2022-02-11 NOTE — ASSESSMENT & PLAN NOTE
Chronic health problem, uncontrolled, discussed with patient about stress going on at home.  He denies any depression symptoms.  He reports that it is getting better at home.  Discussed about importance of compliance to these medications to control his diabetes and consequences of uncontrolled type 2 diabetes mellitus.  Start Trulicity 0.75 mg every 7 days, take this for 1 month and then will increase it to 1.5 mg every 7 days.  Recommended to contact us if this medication is not covered by his insurance.  We will have him follow-up with pharmacist for samples.  Continue Metformin 1000 mg twice daily.  Eye exam done in office today.  Recommended to start aspirin 81 mg daily.

## 2022-02-11 NOTE — PROGRESS NOTES
FAMILY MEDICINE VISIT                                                               Chief complaint::Diagnoses of Type 2 diabetes mellitus with hyperglycemia, without long-term current use of insulin (HCC), Dyslipidemia (high LDL; low HDL), and Encounter for vitamin deficiency screening were pertinent to this visit.    History of present illness: Jhonatan Ross is a 41 y.o. male who presented for diabetes follow-up    .  Problem   Type 2 Diabetes Mellitus With Hyperglycemia, Without Long-Term Current Use of Insulin (Hcc)    He was diagnosed with diabetes in 2018.  He had work-up at that time to differentiate between type I and type 2 diabetes.  Work-up came back negative for type 1 diabetes.  He is on Metformin 1000 mg twice daily, reports that he stopped taking medication as there was lot of stress going at home but restarted medication again.  Previously he was on insulin, currently not on insulin.  Reports that his blood sugars are coming in 300 range.  A1c in office came back at 10.1    Lab Results   Component Value Date/Time    HBA1C 10.1 (A) 02/11/2022 08:51 AM         Dyslipidemia (High Ldl; Low Hdl)    Last lipid panel was done in July 21 and came back in normal range.  He is on Lipitor 20 mg daily.  Able to tolerate this medication without any side effects.    Lab Results   Component Value Date/Time    CHOLSTRLTOT 126 07/30/2021 0750    TRIGLYCERIDE 109 07/30/2021 0750    HDL 41 07/30/2021 0750    LDL 63 07/30/2021 0750            Review of systems:     Review of Systems   Constitutional: Negative for chills, fever and malaise/fatigue.   Respiratory: Negative for cough and wheezing.    Cardiovascular: Negative for chest pain, palpitations and leg swelling.        Medications and Allergies:     Current Outpatient Medications   Medication Sig Dispense Refill   • aspirin EC (ECOTRIN) 81 MG Tablet Delayed Response Take 81 mg by mouth every day.     • Dulaglutide (TRULICITY) 0.75 MG/0.5ML Solution Pen-injector  "Inject 0.5 mL under the skin every 7 days. 3 Each 1   • atorvastatin (LIPITOR) 20 MG Tab TAKE 1 TABLET BY MOUTH EVERY DAY 90 Tablet 1   • metformin (GLUCOPHAGE) 1000 MG tablet TAKE 1 TABLET BY MOUTH TWICE DAILY WITH MEALS 180 Tablet 1   • ONE TOUCH ULTRA TEST strip TEST THREE TIMES DAILY 300 Strip 0     No current facility-administered medications for this visit.          Vitals:    /62 (BP Location: Left arm, Patient Position: Sitting, BP Cuff Size: Adult)   Pulse 75   Temp 36.3 °C (97.4 °F)   Resp 16   Ht 1.676 m (5' 6\")   Wt 66 kg (145 lb 8.1 oz)   SpO2 97%  Body mass index is 23.48 kg/m².    Physical Exam:     Physical Exam  Constitutional:       General: He is not in acute distress.  HENT:      Head: Normocephalic and atraumatic.   Eyes:      Conjunctiva/sclera: Conjunctivae normal.   Cardiovascular:      Rate and Rhythm: Normal rate.   Pulmonary:      Effort: Pulmonary effort is normal. No respiratory distress.   Musculoskeletal:         General: No deformity.      Cervical back: Neck supple.   Skin:     Findings: No rash.   Neurological:      Mental Status: He is alert.      Gait: Gait is intact.   Psychiatric:         Mood and Affect: Mood and affect normal.         Judgment: Judgment normal.            Assessment/Plan:    Problem List Items Addressed This Visit     Dyslipidemia (high LDL; low HDL)     Chronic health problem, stable based on last labs.  Recheck lipid panel to assess control.  Continue Lipitor 20 mg daily.         Relevant Orders    Lipid Profile    Type 2 diabetes mellitus with hyperglycemia, without long-term current use of insulin (HCC)     Chronic health problem, uncontrolled, discussed with patient about stress going on at home.  He denies any depression symptoms.  He reports that it is getting better at home.  Discussed about importance of compliance to these medications to control his diabetes and consequences of uncontrolled type 2 diabetes mellitus.  Start Trulicity 0.75 " mg every 7 days, take this for 1 month and then will increase it to 1.5 mg every 7 days.  Recommended to contact us if this medication is not covered by his insurance.  We will have him follow-up with pharmacist for samples.  Continue Metformin 1000 mg twice daily.  Eye exam done in office today.  Recommended to start aspirin 81 mg daily.         Relevant Medications    Dulaglutide (TRULICITY) 0.75 MG/0.5ML Solution Pen-injector    Other Relevant Orders    POCT  A1C (Completed)    CBC WITHOUT DIFFERENTIAL    Comp Metabolic Panel    HEMOGLOBIN A1C    VITAMIN B12    VITAMIN D,25 HYDROXY    POCT Retinal Eye Exam      Other Visit Diagnoses     Encounter for vitamin deficiency screening        Relevant Orders    VITAMIN D,25 HYDROXY      Discussed with patient about vaccines.  He refused influenza and hepatitis B vaccine today.    Please note that this dictation was created using voice recognition software. I have made every reasonable attempt to correct obvious errors, but I expect that there are errors of grammar and possibly content that I did not discover before finalizing the note.    Follow up on 5/27/2022 for lab follow-up.

## 2022-04-08 DIAGNOSIS — E11.65 TYPE 2 DIABETES MELLITUS WITH HYPERGLYCEMIA, WITHOUT LONG-TERM CURRENT USE OF INSULIN (HCC): ICD-10-CM

## 2022-04-08 RX ORDER — DULAGLUTIDE 1.5 MG/.5ML
0.5 INJECTION, SOLUTION SUBCUTANEOUS
Qty: 3 EACH | Refills: 3 | Status: SHIPPED | OUTPATIENT
Start: 2022-04-08 | End: 2022-11-18

## 2022-04-08 NOTE — TELEPHONE ENCOUNTER
Please call patient regarding change in pharmacy.  This [facility] pharmacy is in [place].  Per recent recommendations as I have license only for [facility], please ask patient for pharmacy in [place] which is closer to him.   Negative/Unknown

## 2022-05-27 ENCOUNTER — OFFICE VISIT (OUTPATIENT)
Dept: MEDICAL GROUP | Facility: MEDICAL CENTER | Age: 42
End: 2022-05-27
Payer: COMMERCIAL

## 2022-05-27 VITALS
BODY MASS INDEX: 23.74 KG/M2 | RESPIRATION RATE: 16 BRPM | DIASTOLIC BLOOD PRESSURE: 54 MMHG | HEIGHT: 66 IN | TEMPERATURE: 97.4 F | SYSTOLIC BLOOD PRESSURE: 110 MMHG | OXYGEN SATURATION: 100 % | WEIGHT: 147.71 LBS | HEART RATE: 74 BPM

## 2022-05-27 DIAGNOSIS — E11.9 TYPE 2 DIABETES MELLITUS WITHOUT COMPLICATION, WITHOUT LONG-TERM CURRENT USE OF INSULIN (HCC): ICD-10-CM

## 2022-05-27 DIAGNOSIS — E78.5 DYSLIPIDEMIA (HIGH LDL; LOW HDL): ICD-10-CM

## 2022-05-27 DIAGNOSIS — E55.9 VITAMIN D DEFICIENCY: ICD-10-CM

## 2022-05-27 PROCEDURE — 99214 OFFICE O/P EST MOD 30 MIN: CPT | Performed by: FAMILY MEDICINE

## 2022-05-27 RX ORDER — ERGOCALCIFEROL 1.25 MG/1
50000 CAPSULE ORAL
Qty: 12 CAPSULE | Refills: 2 | Status: SHIPPED | OUTPATIENT
Start: 2022-05-27 | End: 2023-03-07

## 2022-05-27 RX ORDER — ROSUVASTATIN CALCIUM 20 MG/1
20 TABLET, COATED ORAL EVERY EVENING
Qty: 90 TABLET | Refills: 3 | Status: SHIPPED | OUTPATIENT
Start: 2022-05-27

## 2022-05-27 ASSESSMENT — ENCOUNTER SYMPTOMS
CHILLS: 0
COUGH: 0
SHORTNESS OF BREATH: 0
PALPITATIONS: 0
FEVER: 0
WHEEZING: 0

## 2022-05-27 ASSESSMENT — FIBROSIS 4 INDEX: FIB4 SCORE: 0.9

## 2022-05-27 NOTE — ASSESSMENT & PLAN NOTE
New health problem, start vitamin D 50,000 international units every week.  Recheck labs in 6 months.

## 2022-05-27 NOTE — ASSESSMENT & PLAN NOTE
Chronic health problem, significantly improved, continue same medication regimen.  Recheck labs in 6 months.  Recommended to start taking vitamin B12 supplementation.  We will request eye exam results which we did.  If those were not successful, will recommend him to go to ophthalmology for eye exam.

## 2022-05-27 NOTE — ASSESSMENT & PLAN NOTE
Chronic health problem, not able to tolerate Lipitor, uncontrolled, goal LDL less than 100 due to history of diabetes.  Start Crestor 20 mg daily.  Recheck labs in 6-month.  Eat healthy diet and exercise 150 minutes in a week.

## 2022-05-27 NOTE — PROGRESS NOTES
FAMILY MEDICINE VISIT                                                               Chief complaint::Diagnoses of Type 2 diabetes mellitus without complication, without long-term current use of insulin (HCC), Dyslipidemia (high LDL; low HDL), and Vitamin D deficiency were pertinent to this visit.    History of present illness: Jhonatan Ross is a 41 y.o. male who presented for lab follow-up.    Problem   Vitamin D Deficiency    Recent vitamin D came back at 21.7.  He is not taking any vitamin D supplementation.     Type 2 Diabetes Mellitus Without Complication, Without Long-Term Current Use of Insulin (Hcc)    He is currently on metformin 1000 mg twice daily and Trulicity 1.5 mg every 7 days.  His recent A1c significantly improved to 6.5 from 10.0.  Vitamin B12 level came back at 412.  Last microalbumin creatinine ratio checked in July of last year was negative.  We did eye exam at last visit, has not received the records for eye exam results.  On statin, on aspirin, not on ACE inhibitor as blood pressure is on lower end and he does not have microalbuminurea in his urine.    Monofilament testing with a 10 gram force: sensation intact: intact bilaterally  Visual Inspection: Feet without maceration, ulcers, fissures.  Pedal pulses: intact bilaterally     Dyslipidemia (High Ldl; Low Hdl)    He is on Lipitor 20 mg daily, reports that had side effects with this medication.  Reports stomach upset with this medication.  His recent labs showed total cholesterol at 196, HDL 41, triglyceride 216 and LDL at 121.             Review of systems:     Review of Systems   Constitutional: Negative for chills, fever and malaise/fatigue.   Respiratory: Negative for cough, shortness of breath and wheezing.    Cardiovascular: Negative for chest pain, palpitations and leg swelling.        Medications and Allergies:     Current Outpatient Medications   Medication Sig Dispense Refill   • rosuvastatin (CRESTOR) 20 MG Tab Take 1 Tablet by  "mouth every evening. 90 Tablet 3   • vitamin D2, Ergocalciferol, (DRISDOL) 1.25 MG (68577 UT) Cap capsule Take 1 Capsule by mouth every 7 days. 12 Capsule 2   • Dulaglutide (TRULICITY) 1.5 MG/0.5ML Solution Pen-injector Inject 0.5 mL under the skin every 7 days. 3 Each 3   • aspirin EC (ECOTRIN) 81 MG Tablet Delayed Response Take 81 mg by mouth every day.     • metformin (GLUCOPHAGE) 1000 MG tablet TAKE 1 TABLET BY MOUTH TWICE DAILY WITH MEALS 180 Tablet 1   • ONE TOUCH ULTRA TEST strip TEST THREE TIMES DAILY 300 Strip 0     No current facility-administered medications for this visit.          Vitals:    /54 (BP Location: Left arm, Patient Position: Sitting, BP Cuff Size: Adult)   Pulse 74   Temp 36.3 °C (97.4 °F)   Resp 16   Ht 1.676 m (5' 6\")   Wt 67 kg (147 lb 11.3 oz)   SpO2 100%  Body mass index is 23.84 kg/m².    Physical Exam:     Physical Exam  Constitutional:       General: He is not in acute distress.  HENT:      Head: Normocephalic and atraumatic.   Eyes:      Conjunctiva/sclera: Conjunctivae normal.   Cardiovascular:      Rate and Rhythm: Normal rate and regular rhythm.      Heart sounds: Normal heart sounds. No murmur heard.    No friction rub. No gallop.   Pulmonary:      Effort: Pulmonary effort is normal. No respiratory distress.      Breath sounds: Normal breath sounds. No wheezing or rales.   Musculoskeletal:         General: No deformity.      Cervical back: Neck supple.   Neurological:      Mental Status: He is alert.      Gait: Gait is intact.   Psychiatric:         Mood and Affect: Mood and affect normal.         Judgment: Judgment normal.          Labs:  I reviewed with patient recent labs resulted on 5/26/2022    Assessment/Plan:    Problem List Items Addressed This Visit     Dyslipidemia (high LDL; low HDL)     Chronic health problem, not able to tolerate Lipitor, uncontrolled, goal LDL less than 100 due to history of diabetes.  Start Crestor 20 mg daily.  Recheck labs in " 6-month.  Eat healthy diet and exercise 150 minutes in a week.           Relevant Medications    rosuvastatin (CRESTOR) 20 MG Tab    Other Relevant Orders    Lipid Profile    Type 2 diabetes mellitus without complication, without long-term current use of insulin (HCC)     Chronic health problem, significantly improved, continue same medication regimen.  Recheck labs in 6 months.  Recommended to start taking vitamin B12 supplementation.  We will request eye exam results which we did.  If those were not successful, will recommend him to go to ophthalmology for eye exam.  Discussed patient about hepatitis B vaccine hep B vaccine., would like to hold onto.           Relevant Orders    Diabetic Monofilament Lower Extremity Exam (Completed)    Comp Metabolic Panel    HEMOGLOBIN A1C    VITAMIN B12    MICROALBUMIN CREAT RATIO URINE    Vitamin D deficiency     New health problem, start vitamin D 50,000 international units every week.  Recheck labs in 6 months.           Relevant Medications    vitamin D2, Ergocalciferol, (DRISDOL) 1.25 MG (95551 UT) Cap capsule    Other Relevant Orders    VITAMIN D,25 HYDROXY           Please note that this dictation was created using voice recognition software. I have made every reasonable attempt to correct obvious errors, but I expect that there are errors of grammar and possibly content that I did not discover before finalizing the note.    Follow up in 6 months for lab follow-up.

## 2022-11-18 ENCOUNTER — HOSPITAL ENCOUNTER (OUTPATIENT)
Dept: LAB | Facility: MEDICAL CENTER | Age: 42
End: 2022-11-18
Attending: FAMILY MEDICINE
Payer: COMMERCIAL

## 2022-11-18 ENCOUNTER — OFFICE VISIT (OUTPATIENT)
Dept: MEDICAL GROUP | Facility: MEDICAL CENTER | Age: 42
End: 2022-11-18
Payer: COMMERCIAL

## 2022-11-18 VITALS
OXYGEN SATURATION: 97 % | RESPIRATION RATE: 16 BRPM | DIASTOLIC BLOOD PRESSURE: 56 MMHG | HEART RATE: 74 BPM | HEIGHT: 66 IN | TEMPERATURE: 97.8 F | BODY MASS INDEX: 21.61 KG/M2 | WEIGHT: 134.48 LBS | SYSTOLIC BLOOD PRESSURE: 118 MMHG

## 2022-11-18 DIAGNOSIS — Z11.59 NEED FOR HEPATITIS C SCREENING TEST: ICD-10-CM

## 2022-11-18 DIAGNOSIS — Z79.4 TYPE 2 DIABETES MELLITUS WITH HYPERGLYCEMIA, WITH LONG-TERM CURRENT USE OF INSULIN (HCC): ICD-10-CM

## 2022-11-18 DIAGNOSIS — Z11.4 SCREENING FOR HIV (HUMAN IMMUNODEFICIENCY VIRUS): ICD-10-CM

## 2022-11-18 DIAGNOSIS — E78.5 DYSLIPIDEMIA ASSOCIATED WITH TYPE 2 DIABETES MELLITUS (HCC): ICD-10-CM

## 2022-11-18 DIAGNOSIS — E55.9 VITAMIN D DEFICIENCY: ICD-10-CM

## 2022-11-18 DIAGNOSIS — E11.69 DYSLIPIDEMIA ASSOCIATED WITH TYPE 2 DIABETES MELLITUS (HCC): ICD-10-CM

## 2022-11-18 DIAGNOSIS — E11.65 TYPE 2 DIABETES MELLITUS WITH HYPERGLYCEMIA, WITH LONG-TERM CURRENT USE OF INSULIN (HCC): ICD-10-CM

## 2022-11-18 LAB
HBA1C MFR BLD: 12.8 % (ref 0–5.6)
INT CON NEG: NEGATIVE
INT CON POS: POSITIVE

## 2022-11-18 PROCEDURE — 36415 COLL VENOUS BLD VENIPUNCTURE: CPT

## 2022-11-18 PROCEDURE — 84681 ASSAY OF C-PEPTIDE: CPT

## 2022-11-18 PROCEDURE — 83036 HEMOGLOBIN GLYCOSYLATED A1C: CPT | Performed by: FAMILY MEDICINE

## 2022-11-18 PROCEDURE — 83525 ASSAY OF INSULIN: CPT

## 2022-11-18 PROCEDURE — 99214 OFFICE O/P EST MOD 30 MIN: CPT | Performed by: FAMILY MEDICINE

## 2022-11-18 RX ORDER — DULAGLUTIDE 3 MG/.5ML
0.5 INJECTION, SOLUTION SUBCUTANEOUS
Qty: 3 ML | Refills: 3 | Status: SHIPPED | OUTPATIENT
Start: 2022-11-18 | End: 2024-03-07

## 2022-11-18 RX ORDER — FLASH GLUCOSE SENSOR
1 KIT MISCELLANEOUS
Qty: 4 EACH | Refills: 3 | Status: SHIPPED | OUTPATIENT
Start: 2022-11-18

## 2022-11-18 RX ORDER — INSULIN GLARGINE 100 [IU]/ML
10 INJECTION, SOLUTION SUBCUTANEOUS EVERY EVENING
Qty: 3 ML | Refills: 3 | Status: SHIPPED | OUTPATIENT
Start: 2022-11-18 | End: 2024-03-07

## 2022-11-18 ASSESSMENT — ENCOUNTER SYMPTOMS
FEVER: 0
WEIGHT LOSS: 1
PALPITATIONS: 0
CHILLS: 0

## 2022-11-18 ASSESSMENT — FIBROSIS 4 INDEX: FIB4 SCORE: 0.92

## 2022-11-18 NOTE — ASSESSMENT & PLAN NOTE
Chronic problem, status unknown as we have to request his recent labs from [facility].  Continue high-dose vitamin D supplementation.

## 2022-11-18 NOTE — PROGRESS NOTES
FAMILY MEDICINE VISIT                                                               Chief complaint::Diagnoses of Type 2 diabetes mellitus with hyperglycemia, with long-term current use of insulin (HCC), Dyslipidemia associated with type 2 diabetes mellitus (HCC), Vitamin D deficiency, Need for hepatitis C screening test, and Screening for HIV (human immunodeficiency virus) were pertinent to this visit.    History of present illness: Jhonatan Ross is a 42 y.o. male who presented for lab follow-up    Problem   Vitamin D Deficiency    Recent vitamin D came back at 21.7.  He is taking vitamin D high-dose 50,000 international units weekly.     Type 2 Diabetes Mellitus With Hyperglycemia (Hcc)    He is currently on metformin 1000 mg twice daily and Trulicity 1.5 mg every 7 days.  Reports that he had a weight loss and has been having itching all over his body.  He does not check blood sugars at home.  Point-of-care A1c came back at 12.8.  He did his blood work at [facility], will request his records.     Dyslipidemia Associated With Type 2 Diabetes Mellitus (Hcc)    He is on Lipitor 20 mg daily, reports that had side effects with this medication.    His recent labs showed total cholesterol at 196, HDL 41, triglyceride 216 and LDL at 121.  Recent labs he did was at [facility]            Review of systems:     Review of Systems   Constitutional:  Positive for weight loss. Negative for chills, fever and malaise/fatigue.   Cardiovascular:  Negative for chest pain, palpitations and leg swelling.      Medications and Allergies:     Current Outpatient Medications   Medication Sig Dispense Refill    Dulaglutide (TRULICITY) 3 MG/0.5ML Solution Pen-injector Inject 0.5 mL under the skin every 7 days. 3 mL 3    insulin glargine 100 UNIT/ML Solution Pen-injector injection Inject 10 Units under the skin every evening. 3 mL 3    Continuous Blood Gluc Sensor (FREESTYLE RONNELL 14 DAY SENSOR) Misc 1 Applicator every 14 days. 4  "Each 3    Continuous Blood Gluc  (FREESTYLE RONNELL 2 READER) Device Use freestyle ronnell reader device with freestyle ronnell sensor for blood sugar monitoring 1 Each 0    rosuvastatin (CRESTOR) 20 MG Tab Take 1 Tablet by mouth every evening. 90 Tablet 3    vitamin D2, Ergocalciferol, (DRISDOL) 1.25 MG (63943 UT) Cap capsule Take 1 Capsule by mouth every 7 days. 12 Capsule 2    aspirin EC (ECOTRIN) 81 MG Tablet Delayed Response Take 81 mg by mouth every day.      metformin (GLUCOPHAGE) 1000 MG tablet TAKE 1 TABLET BY MOUTH TWICE DAILY WITH MEALS 180 Tablet 1    ONE TOUCH ULTRA TEST strip TEST THREE TIMES DAILY 300 Strip 0     No current facility-administered medications for this visit.          Vitals:    /56 (BP Location: Left arm, Patient Position: Sitting, BP Cuff Size: Adult)   Pulse 74   Temp 36.6 °C (97.8 °F)   Resp 16   Ht 1.676 m (5' 6\")   Wt 61 kg (134 lb 7.7 oz)   SpO2 97%  Body mass index is 21.71 kg/m².    Physical Exam:     Physical Exam  Constitutional:       General: He is not in acute distress.  HENT:      Head: Normocephalic and atraumatic.      Right Ear: External ear normal.      Left Ear: External ear normal.   Eyes:      Conjunctiva/sclera: Conjunctivae normal.   Cardiovascular:      Rate and Rhythm: Normal rate.   Pulmonary:      Effort: Pulmonary effort is normal. No respiratory distress.   Musculoskeletal:         General: No deformity.      Cervical back: Neck supple.   Skin:     Findings: No rash.   Neurological:      Mental Status: He is alert.      Gait: Gait is intact.   Psychiatric:         Mood and Affect: Mood and affect normal.         Judgment: Judgment normal.            Assessment/Plan:         Problem List Items Addressed This Visit       Vitamin D deficiency     Chronic problem, status unknown as we have to request his recent labs from [facility].  Continue high-dose vitamin D supplementation.         Relevant Orders    VITAMIN D,25 HYDROXY (DEFICIENCY)    " Type 2 diabetes mellitus with hyperglycemia (HCC)     Chronic problem, uncontrolled, goal A1c is less than 7.  Start Lantus 10 units at bedtime.  Recommended to start Lantus at 5 units and increase every 3 days 2 units to reach 10 units.  Increase Trulicity to 3 mg.  Continue metformin 1000 mg twice daily.  Request records from [facility].  Check insulin and C-peptide level.  Referral to pharmacy for diabetes management         Relevant Medications    Dulaglutide (TRULICITY) 3 MG/0.5ML Solution Pen-injector    insulin glargine 100 UNIT/ML Solution Pen-injector injection    Continuous Blood Gluc Sensor (FREESTYLE RONNELL 14 DAY SENSOR) Misc    Continuous Blood Gluc  (FREESTYLE RONNELL 2 READER) Device    Other Relevant Orders    Comp Metabolic Panel    HEMOGLOBIN A1C    MICROALBUMIN CREAT RATIO URINE    VITAMIN B12    INSULIN AND C-PEPTIDE, SERUM    Referral to Pharmacotherapy Service    Dyslipidemia associated with type 2 diabetes mellitus (HCC)     Chronic problem, uncontrolled based on last labs, request records from [facility] recheck of lipid panel.  Ordered labs for him to do in 3 months.         Relevant Medications    Dulaglutide (TRULICITY) 3 MG/0.5ML Solution Pen-injector    insulin glargine 100 UNIT/ML Solution Pen-injector injection    Other Relevant Orders    Lipid Profile     Other Visit Diagnoses       Need for hepatitis C screening test        Relevant Orders    HEP C VIRUS ANTIBODY    Screening for HIV (human immunodeficiency virus)        Relevant Orders    HIV AG/AB COMBO ASSAY SCREENING           Discussed with patient about due vaccines.  He declined all vaccines.    Please note that this dictation was created using voice recognition software. I have made every reasonable attempt to correct obvious errors, but I expect that there are errors of grammar and possibly content that I did not discover before finalizing the note.    Follow up in 3 months for lab follow-up, in 1 month with  pharmacy staff

## 2022-11-18 NOTE — LETTER
[facility]  Franck Lopes M.D.  [address]  [place] [address]  Fax: [phone]   Authorization for Release/Disclosure of   Protected Health Information   Name: JHONATAN MACIAS : 1980 SSN: [id number]   Address: [address] [place] [address] Phone:    [phone] (home)    I authorize the entity listed below to release/disclose the PHI below to:   [facility], later [facility]/Franck Lopes M.D. and Franck Lopes M.D.   Provider or Entity Name:     Address   City, State, [facility]   Phone:      Fax:     Reason for request: continuity of care   Information to be released:    [  ] LAST COLONOSCOPY,  including any PATH REPORT and follow-up  [  ] LAST FIT/COLOGUARD RESULT [  ] LAST DEXA  [  ] LAST MAMMOGRAM  [  ] LAST PAP  [  ] LAST LABS [  ] RETINA EXAM REPORT  [  ] IMMUNIZATION RECORDS  [  ] Release all info      [  ] Check here and initial the line next to each item to release ALL health information INCLUDING  _____ Care and treatment for drug and / or alcohol abuse  _____ HIV testing, infection status, or AIDS  _____ Genetic Testing    DATES OF SERVICE OR TIME PERIOD TO BE DISCLOSED: _____________  I understand and acknowledge that:  * This Authorization may be revoked at any time by you in writing, except if your health information has already been used or disclosed.  * Your health information that will be used or disclosed as a result of you signing this authorization could be re-disclosed by the recipient. If this occurs, your re-disclosed health information may no longer be protected by State or Federal laws.  * You may refuse to sign this Authorization. Your refusal will not affect your ability to obtain treatment.  * This Authorization becomes effective upon signing and will  on (date) __________.      If no date is indicated, this Authorization will  one (1) year from the signature date.    Name: Jhonatan Macias    Signature:   Date:     2022       PLEASE FAX  REQUESTED RECORDS BACK TO: [phone]

## 2022-11-18 NOTE — ASSESSMENT & PLAN NOTE
Chronic problem, uncontrolled, goal A1c is less than 7.  Start Lantus 10 units at bedtime.  Recommended to start Lantus at 5 units and increase every 3 days 2 units to reach 10 units.  Increase Trulicity to 3 mg.  Continue metformin 1000 mg twice daily.  Request records from [facility].  Check insulin and C-peptide level.  Referral to pharmacy for diabetes management

## 2022-11-18 NOTE — ASSESSMENT & PLAN NOTE
Chronic problem, uncontrolled based on last labs, request records from [facility] recheck of lipid panel.  Ordered labs for him to do in 3 months.

## 2022-11-22 LAB
C PEPTIDE SERPL-MCNC: 1.7 NG/ML (ref 0.5–3.3)
INSULIN P FAST SERPL-ACNC: 5 UIU/ML (ref 3–25)

## 2022-12-16 ENCOUNTER — TELEPHONE (OUTPATIENT)
Dept: VASCULAR LAB | Facility: MEDICAL CENTER | Age: 42
End: 2022-12-16
Payer: COMMERCIAL

## 2022-12-16 NOTE — TELEPHONE ENCOUNTER
Called pt regarding missed pharmacotherapy appt - no answer. LVM asking pt to c/b to reschedule.     Will f/u at a later time.    Bertram Dash, JazlynD, BCACP

## 2022-12-22 ENCOUNTER — TELEPHONE (OUTPATIENT)
Dept: VASCULAR LAB | Facility: MEDICAL CENTER | Age: 42
End: 2022-12-22
Payer: COMMERCIAL

## 2022-12-29 ENCOUNTER — TELEPHONE (OUTPATIENT)
Dept: VASCULAR LAB | Facility: MEDICAL CENTER | Age: 42
End: 2022-12-29
Payer: COMMERCIAL

## 2022-12-29 NOTE — TELEPHONE ENCOUNTER
Called pt regarding missed pharmacotherapy appt - no answer. LVM asking pt to c/b to reschedule.      Sent letter.    Will f/u at a later time.     Bertram Dash, JazlynD, BCACP

## 2022-12-29 NOTE — LETTER
[address] [facility] [id number]        Dear Jhonatan Ross ,    We have been unsuccessful in our attempts to contact you regarding your Diabetes Clinical Pharmacist referral.  Please contact us if you would like to schedule an appointment for help managing your blood sugar.    Please contact our clinic so we may assist you.  We are open Monday-Friday 8 am until 5 pm.  You may reach our Service at [phone].        Sincerely,    Hilario Pittman, JazlynD, [facility]  Clinic Supervisor  [facility]  Outpatient Anticoagulation Service

## 2023-01-12 ENCOUNTER — TELEPHONE (OUTPATIENT)
Dept: VASCULAR LAB | Facility: MEDICAL CENTER | Age: 43
End: 2023-01-12
Payer: COMMERCIAL

## 2023-01-27 ENCOUNTER — TELEPHONE (OUTPATIENT)
Dept: VASCULAR LAB | Facility: MEDICAL CENTER | Age: 43
End: 2023-01-27
Payer: COMMERCIAL

## 2023-02-10 ENCOUNTER — DOCUMENTATION (OUTPATIENT)
Dept: VASCULAR LAB | Facility: MEDICAL CENTER | Age: 43
End: 2023-02-10
Payer: COMMERCIAL

## 2023-02-10 NOTE — PROGRESS NOTES
[facility] and Pharmacotherapy Programs      Called and left msg for pt to call back to establish care regarding a pharmacotherapy referral for DM management  from Dr. Lopes on 11/18/22.      Patient had initial appt scheduled on 12/15/22 but was a no show.  This is the 6th call to try and get the patient rescheduled.   [employer] Private message of non-compliance sent to patient today.      Insurance: [place]  PCP: Cris = Dr. Lopes  Locations to be seen: ANY     Phone number left for return call or any questions or concerns.  [facility] at [phone], fax [phone]      Tyler HobsonD

## 2023-02-12 DIAGNOSIS — E11.9 TYPE 2 DIABETES MELLITUS WITHOUT COMPLICATION, WITHOUT LONG-TERM CURRENT USE OF INSULIN (HCC): ICD-10-CM

## 2023-02-13 NOTE — TELEPHONE ENCOUNTER
Received request via: Patient    Was the patient seen in the last year in this department? Yes    Does the patient have an active prescription (recently filled or refills available) for medication(s) requested? No    Does the patient have [employer] and need 100 day supply (blood pressure, diabetes and cholesterol meds only)? Patient does not have SCP

## 2023-02-24 ENCOUNTER — DOCUMENTATION (OUTPATIENT)
Dept: VASCULAR LAB | Facility: MEDICAL CENTER | Age: 43
End: 2023-02-24
Payer: COMMERCIAL

## 2023-02-24 NOTE — PROGRESS NOTES
[facility] and Pharmacotherapy Programs        Called and left msg for pt to call back to establish care regarding a pharmacotherapy referral for DM management  from Dr. Lopes on 11/18/22.      Patient had initial appt scheduled on 12/15/22 but was a no show.  This is the 7th call to try and get the patient rescheduled.   [employer] message of non-compliance sent to patient last week.  Will await patient to contact clinic.       Insurance: [employer]  PCP: [facility] = Dr. Lopes  Locations to be seen: ANY     Phone number left for return call or any questions or concerns.  [facility] at [phone], fax [phone]        Tyler HobsonD

## 2023-03-07 DIAGNOSIS — E55.9 VITAMIN D DEFICIENCY: ICD-10-CM

## 2023-03-07 RX ORDER — ERGOCALCIFEROL 1.25 MG/1
50000 CAPSULE ORAL
Qty: 12 CAPSULE | Refills: 1 | Status: SHIPPED | OUTPATIENT
Start: 2023-03-07

## 2024-03-07 ENCOUNTER — OFFICE VISIT (OUTPATIENT)
Dept: MEDICAL GROUP | Facility: MEDICAL CENTER | Age: 44
End: 2024-03-07
Payer: COMMERCIAL

## 2024-03-07 VITALS
OXYGEN SATURATION: 98 % | HEIGHT: 66 IN | SYSTOLIC BLOOD PRESSURE: 102 MMHG | TEMPERATURE: 97.3 F | WEIGHT: 156.09 LBS | DIASTOLIC BLOOD PRESSURE: 62 MMHG | BODY MASS INDEX: 25.09 KG/M2 | HEART RATE: 66 BPM

## 2024-03-07 DIAGNOSIS — E55.9 VITAMIN D DEFICIENCY: ICD-10-CM

## 2024-03-07 DIAGNOSIS — Z11.59 NEED FOR HEPATITIS C SCREENING TEST: ICD-10-CM

## 2024-03-07 DIAGNOSIS — Z11.4 SCREENING FOR HIV (HUMAN IMMUNODEFICIENCY VIRUS): ICD-10-CM

## 2024-03-07 DIAGNOSIS — E11.65 TYPE 2 DIABETES MELLITUS WITH HYPERGLYCEMIA, WITH LONG-TERM CURRENT USE OF INSULIN (HCC): ICD-10-CM

## 2024-03-07 DIAGNOSIS — E78.5 DYSLIPIDEMIA ASSOCIATED WITH TYPE 2 DIABETES MELLITUS (HCC): ICD-10-CM

## 2024-03-07 DIAGNOSIS — E11.69 DYSLIPIDEMIA ASSOCIATED WITH TYPE 2 DIABETES MELLITUS (HCC): ICD-10-CM

## 2024-03-07 DIAGNOSIS — Z79.4 TYPE 2 DIABETES MELLITUS WITH HYPERGLYCEMIA, WITH LONG-TERM CURRENT USE OF INSULIN (HCC): ICD-10-CM

## 2024-03-07 DIAGNOSIS — Z01.84 IMMUNITY STATUS TESTING: ICD-10-CM

## 2024-03-07 LAB
HBA1C MFR BLD: 9.2 % (ref ?–5.8)
POCT INT CON NEG: NEGATIVE
POCT INT CON POS: POSITIVE

## 2024-03-07 PROCEDURE — 83036 HEMOGLOBIN GLYCOSYLATED A1C: CPT | Performed by: FAMILY MEDICINE

## 2024-03-07 PROCEDURE — 3078F DIAST BP <80 MM HG: CPT | Performed by: FAMILY MEDICINE

## 2024-03-07 PROCEDURE — 92250 FUNDUS PHOTOGRAPHY W/I&R: CPT | Mod: TC | Performed by: FAMILY MEDICINE

## 2024-03-07 PROCEDURE — 99214 OFFICE O/P EST MOD 30 MIN: CPT | Performed by: FAMILY MEDICINE

## 2024-03-07 PROCEDURE — 3074F SYST BP LT 130 MM HG: CPT | Performed by: FAMILY MEDICINE

## 2024-03-07 ASSESSMENT — ENCOUNTER SYMPTOMS
FEVER: 0
PALPITATIONS: 0
CHILLS: 0

## 2024-03-07 ASSESSMENT — PATIENT HEALTH QUESTIONNAIRE - PHQ9: CLINICAL INTERPRETATION OF PHQ2 SCORE: 0

## 2024-03-08 LAB — RETINAL SCREEN: NEGATIVE

## 2024-03-08 NOTE — PROGRESS NOTES
Verbal consent was acquired by the patient to use [employer] ambient listening note generation during this visit.      Diagnoses and all orders for this visit:    Type 2 diabetes mellitus with hyperglycemia, with long-term current use of insulin (HCC)  -     POCT  A1C  -     POCT Retinal Eye Exam  -     Cancel: MICROALBUMIN CREAT RATIO URINE; Future  -     Diabetic Monofilament LE Exam  -     Comp Metabolic Panel; Future  -     VITAMIN B12; Future  -     MICROALBUMIN CREAT RATIO URINE; Future    Dyslipidemia associated with type 2 diabetes mellitus (HCC)  -     Lipid Profile; Future    Vitamin D deficiency  -     VITAMIN D,25 HYDROXY (DEFICIENCY); Future    Screening for HIV (human immunodeficiency virus)  -     HIV AG/AB COMBO ASSAY SCREENING; Future    Need for hepatitis C screening test  -     HEP C VIRUS ANTIBODY; Future    Immunity status testing  -     HEP B SURFACE AB; Future                  Assessment & Plan  1. Type 2 diabetes mellitus.  This is a chronic condition. His A1c today came back above 9, which is uncontrolled. We will restart with metformin 1000 mg twice daily and add Jardiance 1 tablet daily. We discussed about diet modification and also physical activity. I will check his liver function tests, kidney function test, cholesterols, as well as urine test to check for kidneys.  Eye exam in office done today.    2. Dyslipidemia.  Chronic condition, status unknown. I will check his lipid panel to assess control and we will start medication based on those results. If his LDL cholesterol is less than 100, then he does not need cholesterol medication.    3. Vitamin D deficiency.  This is chronic, unstable. We will recheck his vitamin D level.    Follow-up  The patient will follow up in 1 month.          Chief complaint::Diagnoses of Type 2 diabetes mellitus with hyperglycemia, with long-term current use of insulin (HCC), Dyslipidemia associated with type 2 diabetes mellitus (HCC), Vitamin D deficiency,  Screening for HIV (human immunodeficiency virus), Need for hepatitis C screening test, and Immunity status testing were pertinent to this visit.      History of Present Illness  The patient is a 43-year-old male who is here for his diabetes follow-up as well as dyslipidemia follow-up. He has not been seen in the office since 2022.    He continues to take metformin. He is not taking Trulicity or Lantus insulin. He lost weight from 114 pounds. He would wake up every morning with vomiting and diarrhea when he was on Trulicity. This happened as soon as he started taking all of his medications. He went to the emergency room when he was first diagnosed with diabetes and was given medication to control his blood sugars. He checks his blood sugars occasionally after eating. His blood sugar ranges from 150 to 300. He was using his insulin when he is not taking care of what he eats and eats sweets. He denies any numbness or tingling in his feet.    He has not been taking his cholesterol medication.      Review of Systems   Constitutional:  Negative for chills and fever.   Cardiovascular:  Negative for chest pain, palpitations and leg swelling.          Medications and Allergies:     Current Outpatient Medications   Medication Sig Dispense Refill    metformin (GLUCOPHAGE) 1000 MG tablet GENERIC FOR GLUCOPHAGE. TAKE 1 TABLET BY MOUTH TWICE DAILY WITH MEALS 180 Tablet 0    vitamin D2, Ergocalciferol, (DRISDOL) 1.25 MG (29388 UT) Cap capsule TAKE 1 CAPSULE BY MOUTH EVERY 7 DAYS 12 Capsule 1    Continuous Blood Gluc Sensor (FREESTYLE RONNELL 14 DAY SENSOR) Misc 1 Applicator every 14 days. 4 Each 3    Continuous Blood Gluc  (FREESTYLE RONNELL 2 READER) Device Use freestyle ronnell reader device with freestyle ronnell sensor for blood sugar monitoring 1 Each 0    rosuvastatin (CRESTOR) 20 MG Tab Take 1 Tablet by mouth every evening. 90 Tablet 3    aspirin EC (ECOTRIN) 81 MG Tablet Delayed Response Take 81 mg by mouth every day.       "ONE TOUCH ULTRA TEST strip TEST THREE TIMES DAILY 300 Strip 0     No current facility-administered medications for this visit.       /62 (BP Location: Left arm, Patient Position: Sitting, BP Cuff Size: Adult)   Pulse 66   Temp 36.3 °C (97.3 °F) (Temporal)   Ht 1.676 m (5' 6\")   Wt 70.8 kg (156 lb 1.4 oz)   SpO2 98% , Body mass index is 25.19 kg/m².      Physical Exam  Constitutional:       Appearance: Normal appearance. He is well-developed and well-groomed.   HENT:      Head: Normocephalic and atraumatic.   Eyes:      General:         Right eye: No discharge.         Left eye: No discharge.      Conjunctiva/sclera: Conjunctivae normal.   Cardiovascular:      Rate and Rhythm: Normal rate and regular rhythm.      Heart sounds: Normal heart sounds. No murmur heard.     No friction rub. No gallop.   Pulmonary:      Effort: Pulmonary effort is normal. No respiratory distress.      Breath sounds: Normal breath sounds. No wheezing or rales.   Neurological:      General: No focal deficit present.      Mental Status: He is alert. Mental status is at baseline.      Gait: Gait is intact.   Psychiatric:         Mood and Affect: Mood and affect normal.         Behavior: Behavior normal.              Results  Laboratory Studies  Labs A1c were reviewed with the patient.          Please note that this dictation was created using voice recognition software. I have made every reasonable attempt to correct obvious errors, but I expect that there are errors of grammar and possibly content that I did not discover before finalizing the note.      "

## 2024-04-27 ENCOUNTER — HOSPITAL ENCOUNTER (OUTPATIENT)
Dept: RADIOLOGY | Facility: MEDICAL CENTER | Age: 44
End: 2024-04-27
Attending: CHIROPRACTOR
Payer: COMMERCIAL

## 2024-04-27 DIAGNOSIS — M54.50 LOW BACK PAIN, UNSPECIFIED BACK PAIN LATERALITY, UNSPECIFIED CHRONICITY, UNSPECIFIED WHETHER SCIATICA PRESENT: ICD-10-CM

## 2024-04-27 DIAGNOSIS — M54.2 CERVICALGIA: ICD-10-CM

## 2024-04-27 PROCEDURE — 72040 X-RAY EXAM NECK SPINE 2-3 VW: CPT

## 2024-04-27 PROCEDURE — 72100 X-RAY EXAM L-S SPINE 2/3 VWS: CPT

## 2024-05-03 ENCOUNTER — APPOINTMENT (OUTPATIENT)
Dept: MEDICAL GROUP | Facility: MEDICAL CENTER | Age: 44
End: 2024-05-03
Payer: COMMERCIAL

## 2024-05-03 ENCOUNTER — OFFICE VISIT (OUTPATIENT)
Dept: MEDICAL GROUP | Facility: MEDICAL CENTER | Age: 44
End: 2024-05-03
Payer: COMMERCIAL

## 2024-05-03 VITALS
BODY MASS INDEX: 25.07 KG/M2 | HEIGHT: 66 IN | HEART RATE: 65 BPM | OXYGEN SATURATION: 97 % | DIASTOLIC BLOOD PRESSURE: 58 MMHG | TEMPERATURE: 97.4 F | WEIGHT: 156 LBS | RESPIRATION RATE: 16 BRPM | SYSTOLIC BLOOD PRESSURE: 112 MMHG

## 2024-05-03 DIAGNOSIS — E11.69 DYSLIPIDEMIA ASSOCIATED WITH TYPE 2 DIABETES MELLITUS (HCC): ICD-10-CM

## 2024-05-03 DIAGNOSIS — E11.65 TYPE 2 DIABETES MELLITUS WITH HYPERGLYCEMIA, WITH LONG-TERM CURRENT USE OF INSULIN (HCC): ICD-10-CM

## 2024-05-03 DIAGNOSIS — Z79.4 TYPE 2 DIABETES MELLITUS WITH HYPERGLYCEMIA, WITH LONG-TERM CURRENT USE OF INSULIN (HCC): ICD-10-CM

## 2024-05-03 DIAGNOSIS — E78.5 DYSLIPIDEMIA ASSOCIATED WITH TYPE 2 DIABETES MELLITUS (HCC): ICD-10-CM

## 2024-05-03 DIAGNOSIS — E55.9 VITAMIN D DEFICIENCY: ICD-10-CM

## 2024-05-03 PROCEDURE — 3078F DIAST BP <80 MM HG: CPT | Performed by: FAMILY MEDICINE

## 2024-05-03 PROCEDURE — 3074F SYST BP LT 130 MM HG: CPT | Performed by: FAMILY MEDICINE

## 2024-05-03 PROCEDURE — 99214 OFFICE O/P EST MOD 30 MIN: CPT | Performed by: FAMILY MEDICINE

## 2024-05-03 RX ORDER — ROSUVASTATIN CALCIUM 10 MG/1
10 TABLET, COATED ORAL EVERY EVENING
Qty: 90 TABLET | Refills: 3 | Status: SHIPPED | OUTPATIENT
Start: 2024-05-03

## 2024-05-03 ASSESSMENT — ENCOUNTER SYMPTOMS
FEVER: 0
CHILLS: 0
PALPITATIONS: 0

## 2024-05-03 NOTE — PROGRESS NOTES
Verbal consent was acquired by the patient to use [employer] ambient listening note generation during this visit.      Jhonatan was seen today for follow-up and lab results.    Diagnoses and all orders for this visit:    Dyslipidemia associated with type 2 diabetes mellitus (HCC)  -     rosuvastatin (CRESTOR) 10 MG Tab; Take 1 Tablet by mouth every evening.  -     Lipid Profile; Future    Type 2 diabetes mellitus with hyperglycemia, with long-term current use of insulin (HCC)  -     Comp Metabolic Panel; Future  -     HEMOGLOBIN A1C; Future    Vitamin D deficiency  -     VITAMIN D,25 HYDROXY (DEFICIENCY); Future                  Assessment & Plan  1. Dyslipidemia.  This is a chronic and unstable condition, with the patient's LDL level being elevated. The target LDL level is set to be less than 100. The patient will recommence a daily regimen of Crestor 10 mg.    2. Type 2 diabetes mellitus.  This condition is a chronic and stable condition. The patient is advised to continue with the current regimen of metformin and Jardiance, maintain a healthy diet, and regular exercise. The patient's labs will be reassessed with the next blood test.    3. Vitamin D deficiency.  This is a chronic and unstable condition. The patient's vitamin D level is elevated. It is recommended that the patient abstains from taking vitamin D supplements. The patient's labs will be rechecked in 2 to 3 months.    Follow-up  The patient is scheduled for a follow-up visit in 2 to 3 months for a blood test follow-up.          Chief complaint::Diagnoses of Dyslipidemia associated with type 2 diabetes mellitus (HCC), Type 2 diabetes mellitus with hyperglycemia, with long-term current use of insulin (HCC), and Vitamin D deficiency were pertinent to this visit.      History of Present Illness  The patient is a 43-year-old male who is here for blood test follow-up. Blood test was done in 03/2024.    The patient has been monitoring his blood glucose levels  "at home, which were recorded as 160 two days ago. He attributes this to a recent consumption of ice cream the previous night. His current medication regimen includes metformin and Jardiance.    The patient has discontinued his cholesterol medication due to adverse effects including vomiting and loss of appetite. He attributes these symptoms to his concurrent use of insulin injections.    The patient has depleted his supply of vitamin D supplements.      Review of Systems   Constitutional:  Negative for chills and fever.   Cardiovascular:  Negative for chest pain, palpitations and leg swelling.          Medications and Allergies:     Current Outpatient Medications   Medication Sig Dispense Refill    rosuvastatin (CRESTOR) 10 MG Tab Take 1 Tablet by mouth every evening. 90 Tablet 3    metformin (GLUCOPHAGE) 1000 MG tablet Take 1 Tablet by mouth 2 times a day with meals. 180 Tablet 3    Empagliflozin 25 MG Tab Take 1 Tablet by mouth every day. 90 Tablet 3    aspirin EC (ECOTRIN) 81 MG Tablet Delayed Response Take 81 mg by mouth every day.      ONE TOUCH ULTRA TEST strip TEST THREE TIMES DAILY 300 Strip 0     No current facility-administered medications for this visit.       /58   Pulse 65   Temp 36.3 °C (97.4 °F)   Resp 16   Ht 1.676 m (5' 6\")   Wt 70.8 kg (156 lb)   SpO2 97% , Body mass index is 25.18 kg/m².      Physical Exam  Constitutional:       Appearance: Normal appearance. He is well-developed and well-groomed.   HENT:      Head: Normocephalic and atraumatic.      Right Ear: External ear normal.      Left Ear: External ear normal.   Eyes:      General:         Right eye: No discharge.         Left eye: No discharge.      Conjunctiva/sclera: Conjunctivae normal.   Cardiovascular:      Rate and Rhythm: Normal rate.   Pulmonary:      Effort: Pulmonary effort is normal. No respiratory distress.   Musculoskeletal:      Cervical back: Neck supple.   Skin:     Findings: No rash.   Neurological:      Mental " Status: He is alert.   Psychiatric:         Mood and Affect: Mood and affect normal.         Behavior: Behavior normal.              Results  Laboratory Studies from 3/15/2024 discussed with patient.            Please note that this dictation was created using voice recognition software. I have made every reasonable attempt to correct obvious errors, but I expect that there are errors of grammar and possibly content that I did not discover before finalizing the note.

## 2024-07-26 ENCOUNTER — HOSPITAL ENCOUNTER (OUTPATIENT)
Dept: LAB | Facility: MEDICAL CENTER | Age: 44
End: 2024-07-26
Attending: FAMILY MEDICINE
Payer: COMMERCIAL

## 2024-07-26 DIAGNOSIS — E55.9 VITAMIN D DEFICIENCY: ICD-10-CM

## 2024-07-26 DIAGNOSIS — Z79.4 TYPE 2 DIABETES MELLITUS WITH HYPERGLYCEMIA, WITH LONG-TERM CURRENT USE OF INSULIN (HCC): ICD-10-CM

## 2024-07-26 DIAGNOSIS — E11.65 TYPE 2 DIABETES MELLITUS WITH HYPERGLYCEMIA, WITH LONG-TERM CURRENT USE OF INSULIN (HCC): ICD-10-CM

## 2024-07-26 DIAGNOSIS — E78.5 DYSLIPIDEMIA ASSOCIATED WITH TYPE 2 DIABETES MELLITUS (HCC): ICD-10-CM

## 2024-07-26 DIAGNOSIS — E11.69 DYSLIPIDEMIA ASSOCIATED WITH TYPE 2 DIABETES MELLITUS (HCC): ICD-10-CM

## 2024-07-26 LAB
25(OH)D3 SERPL-MCNC: 43 NG/ML (ref 30–100)
ALBUMIN SERPL BCP-MCNC: 4.3 G/DL (ref 3.2–4.9)
ALBUMIN/GLOB SERPL: 1.3 G/DL
ALP SERPL-CCNC: 100 U/L (ref 30–99)
ALT SERPL-CCNC: 18 U/L (ref 2–50)
ANION GAP SERPL CALC-SCNC: 10 MMOL/L (ref 7–16)
AST SERPL-CCNC: 23 U/L (ref 12–45)
BILIRUB SERPL-MCNC: 0.7 MG/DL (ref 0.1–1.5)
BUN SERPL-MCNC: 19 MG/DL (ref 8–22)
CALCIUM ALBUM COR SERPL-MCNC: 8.6 MG/DL (ref 8.5–10.5)
CALCIUM SERPL-MCNC: 8.8 MG/DL (ref 8.4–10.2)
CHLORIDE SERPL-SCNC: 100 MMOL/L (ref 96–112)
CHOLEST SERPL-MCNC: 110 MG/DL (ref 100–199)
CO2 SERPL-SCNC: 28 MMOL/L (ref 20–33)
CREAT SERPL-MCNC: 0.84 MG/DL (ref 0.5–1.4)
EST. AVERAGE GLUCOSE BLD GHB EST-MCNC: 151 MG/DL
FASTING STATUS PATIENT QL REPORTED: NORMAL
GFR SERPLBLD CREATININE-BSD FMLA CKD-EPI: 112 ML/MIN/1.73 M 2
GLOBULIN SER CALC-MCNC: 3.2 G/DL (ref 1.9–3.5)
GLUCOSE SERPL-MCNC: 130 MG/DL (ref 65–99)
HBA1C MFR BLD: 6.9 % (ref 4–5.6)
HDLC SERPL-MCNC: 42 MG/DL
LDLC SERPL CALC-MCNC: 52 MG/DL
POTASSIUM SERPL-SCNC: 4 MMOL/L (ref 3.6–5.5)
PROT SERPL-MCNC: 7.5 G/DL (ref 6–8.2)
SODIUM SERPL-SCNC: 138 MMOL/L (ref 135–145)
TRIGL SERPL-MCNC: 80 MG/DL (ref 0–149)

## 2024-07-26 PROCEDURE — 82306 VITAMIN D 25 HYDROXY: CPT

## 2024-07-26 PROCEDURE — 80061 LIPID PANEL: CPT

## 2024-07-26 PROCEDURE — 83036 HEMOGLOBIN GLYCOSYLATED A1C: CPT

## 2024-07-26 PROCEDURE — 36415 COLL VENOUS BLD VENIPUNCTURE: CPT

## 2024-07-26 PROCEDURE — 80053 COMPREHEN METABOLIC PANEL: CPT

## 2024-08-02 ENCOUNTER — HOSPITAL ENCOUNTER (OUTPATIENT)
Facility: MEDICAL CENTER | Age: 44
End: 2024-08-02
Attending: FAMILY MEDICINE
Payer: COMMERCIAL

## 2024-08-02 ENCOUNTER — OFFICE VISIT (OUTPATIENT)
Dept: MEDICAL GROUP | Facility: MEDICAL CENTER | Age: 44
End: 2024-08-02
Payer: COMMERCIAL

## 2024-08-02 VITALS
DIASTOLIC BLOOD PRESSURE: 66 MMHG | BODY MASS INDEX: 26.36 KG/M2 | RESPIRATION RATE: 14 BRPM | SYSTOLIC BLOOD PRESSURE: 110 MMHG | HEART RATE: 64 BPM | OXYGEN SATURATION: 97 % | HEIGHT: 66 IN | TEMPERATURE: 97.8 F | WEIGHT: 164 LBS

## 2024-08-02 DIAGNOSIS — E11.9 TYPE 2 DIABETES MELLITUS WITHOUT COMPLICATION, WITHOUT LONG-TERM CURRENT USE OF INSULIN (HCC): ICD-10-CM

## 2024-08-02 DIAGNOSIS — E78.5 DYSLIPIDEMIA: ICD-10-CM

## 2024-08-02 LAB
CREAT UR-MCNC: 53.6 MG/DL
MICROALBUMIN UR-MCNC: <1.2 MG/DL
MICROALBUMIN/CREAT UR: NORMAL MG/G (ref 0–30)

## 2024-08-02 PROCEDURE — 82570 ASSAY OF URINE CREATININE: CPT

## 2024-08-02 PROCEDURE — 82043 UR ALBUMIN QUANTITATIVE: CPT

## 2024-08-02 PROCEDURE — 3074F SYST BP LT 130 MM HG: CPT | Performed by: FAMILY MEDICINE

## 2024-08-02 PROCEDURE — 99214 OFFICE O/P EST MOD 30 MIN: CPT | Performed by: FAMILY MEDICINE

## 2024-08-02 PROCEDURE — 3078F DIAST BP <80 MM HG: CPT | Performed by: FAMILY MEDICINE

## 2024-08-02 ASSESSMENT — ENCOUNTER SYMPTOMS
FEVER: 0
CHILLS: 0
PALPITATIONS: 0

## 2024-08-02 NOTE — PROGRESS NOTES
FAMILY MEDICINE VISIT                                                               Assessment/Plan:         Problem List Items Addressed This Visit       Type 2 diabetes mellitus without complication, without long-term current use of insulin (HCC)     Chronic condition, stable, continue metformin 1000 mg 2 times daily and Jardiance 25 mg daily.  Check B12 level with next blood test.  Urine microalbumin creatinine ratio ordered.    He brought DOT paperwork which I filled today and given to patient.  1 copy to be scanned in chart and we will fax this paperwork.         Relevant Orders    MICROALBUMIN CREAT RATIO URINE    Comp Metabolic Panel    HEMOGLOBIN A1C    VITAMIN B12    Dyslipidemia     Chronic condition, stable, continue Crestor 10 mg daily.  Recheck labs in 4 months.         Relevant Orders    Lipid Profile          Follow up in 4 months for lab follow-up.      Chief complaint::Diagnoses of Type 2 diabetes mellitus without complication, without long-term current use of insulin (HCC) and Dyslipidemia were pertinent to this visit.    History of present illness: Jhonatan Ross is a 43 y.o. male who presented for labs and paperwork.    Problem   Type 2 Diabetes Mellitus Without Complication, Without Long-Term Current Use of Insulin (Hcc)    On metformin 1000 mg 2 times daily, Jardiance 25 mg daily    Lab Results   Component Value Date/Time    HBA1C 6.9 (H) 07/26/2024 07:47 AM       Monofilament testing with a 10 gram force: sensation intact: intact bilaterally  Visual Inspection: Feet without maceration, ulcers, fissures.  Pedal pulses: intact bilaterally      Dyslipidemia    On Crestor 10 mg.    Lab Results   Component Value Date/Time    CHOLSTRLTOT 110 07/26/2024 0747    TRIGLYCERIDE 80 07/26/2024 0747    HDL 42 07/26/2024 0747    LDL 52 07/26/2024 0747                    Review of systems:     Review of Systems   Constitutional:  Negative for chills and fever.   Cardiovascular:  Negative for chest pain,  "palpitations and leg swelling.        Medications and Allergies:     Current Outpatient Medications   Medication Sig Dispense Refill    rosuvastatin (CRESTOR) 10 MG Tab Take 1 Tablet by mouth every evening. 90 Tablet 3    metformin (GLUCOPHAGE) 1000 MG tablet Take 1 Tablet by mouth 2 times a day with meals. 180 Tablet 3    Empagliflozin 25 MG Tab Take 1 Tablet by mouth every day. 90 Tablet 3    aspirin EC (ECOTRIN) 81 MG Tablet Delayed Response Take 81 mg by mouth every day.      ONE TOUCH ULTRA TEST strip TEST THREE TIMES DAILY 300 Strip 0     No current facility-administered medications for this visit.          Vitals:    /66 (BP Location: Left arm, Patient Position: Sitting, BP Cuff Size: Adult)   Pulse 64   Temp 36.6 °C (97.8 °F) (Temporal)   Resp 14   Ht 1.676 m (5' 6\")   Wt 74.4 kg (164 lb)   SpO2 97%  Body mass index is 26.47 kg/m².    Physical Exam:     Physical Exam  Constitutional:       Appearance: Normal appearance. He is well-developed and well-groomed.   HENT:      Head: Normocephalic and atraumatic.      Right Ear: External ear normal.      Left Ear: External ear normal.   Eyes:      General:         Right eye: No discharge.         Left eye: No discharge.      Conjunctiva/sclera: Conjunctivae normal.   Cardiovascular:      Rate and Rhythm: Normal rate.   Pulmonary:      Effort: Pulmonary effort is normal. No respiratory distress.   Musculoskeletal:      Cervical back: Neck supple.   Skin:     Findings: No rash.   Neurological:      Mental Status: He is alert.   Psychiatric:         Mood and Affect: Mood and affect normal.         Behavior: Behavior normal.          Labs:  I reviewed with patient recent labs resulted on 7/26/2024.        Please note that this dictation was created using voice recognition software. I have made every reasonable attempt to correct obvious errors, but I expect that there are errors of grammar and possibly content that I did not discover before finalizing the " note.

## 2024-08-02 NOTE — ASSESSMENT & PLAN NOTE
Chronic condition, stable, continue metformin 1000 mg 2 times daily and Jardiance 25 mg daily.  Check B12 level with next blood test.  Urine microalbumin creatinine ratio ordered.    He brought DOT paperwork which I filled today and given to patient.  1 copy to be scanned in chart and we will fax this paperwork.

## 2024-12-27 ENCOUNTER — APPOINTMENT (OUTPATIENT)
Dept: MEDICAL GROUP | Facility: MEDICAL CENTER | Age: 44
End: 2024-12-27
Payer: COMMERCIAL

## 2025-01-17 ENCOUNTER — HOSPITAL ENCOUNTER (OUTPATIENT)
Dept: LAB | Facility: MEDICAL CENTER | Age: 45
End: 2025-01-17
Attending: FAMILY MEDICINE
Payer: COMMERCIAL

## 2025-01-17 DIAGNOSIS — E78.5 DYSLIPIDEMIA: ICD-10-CM

## 2025-01-17 DIAGNOSIS — E11.9 TYPE 2 DIABETES MELLITUS WITHOUT COMPLICATION, WITHOUT LONG-TERM CURRENT USE OF INSULIN (HCC): ICD-10-CM

## 2025-01-17 LAB
ALBUMIN SERPL BCP-MCNC: 4.6 G/DL (ref 3.2–4.9)
ALBUMIN/GLOB SERPL: 1.6 G/DL
ALP SERPL-CCNC: 105 U/L (ref 30–99)
ALT SERPL-CCNC: 16 U/L (ref 2–50)
ANION GAP SERPL CALC-SCNC: 11 MMOL/L (ref 7–16)
AST SERPL-CCNC: 24 U/L (ref 12–45)
BILIRUB SERPL-MCNC: 0.6 MG/DL (ref 0.1–1.5)
BUN SERPL-MCNC: 13 MG/DL (ref 8–22)
CALCIUM ALBUM COR SERPL-MCNC: 8.9 MG/DL (ref 8.5–10.5)
CALCIUM SERPL-MCNC: 9.4 MG/DL (ref 8.5–10.5)
CHLORIDE SERPL-SCNC: 101 MMOL/L (ref 96–112)
CHOLEST SERPL-MCNC: 125 MG/DL (ref 100–199)
CO2 SERPL-SCNC: 26 MMOL/L (ref 20–33)
CREAT SERPL-MCNC: 0.93 MG/DL (ref 0.5–1.4)
EST. AVERAGE GLUCOSE BLD GHB EST-MCNC: 223 MG/DL
GFR SERPLBLD CREATININE-BSD FMLA CKD-EPI: 105 ML/MIN/1.73 M 2
GLOBULIN SER CALC-MCNC: 2.8 G/DL (ref 1.9–3.5)
GLUCOSE SERPL-MCNC: 133 MG/DL (ref 65–99)
HBA1C MFR BLD: 9.4 % (ref 4–5.6)
HDLC SERPL-MCNC: 43 MG/DL
LDLC SERPL CALC-MCNC: 63 MG/DL
POTASSIUM SERPL-SCNC: 4.5 MMOL/L (ref 3.6–5.5)
PROT SERPL-MCNC: 7.4 G/DL (ref 6–8.2)
SODIUM SERPL-SCNC: 138 MMOL/L (ref 135–145)
TRIGL SERPL-MCNC: 97 MG/DL (ref 0–149)
VIT B12 SERPL-MCNC: 415 PG/ML (ref 211–911)

## 2025-01-17 PROCEDURE — 80053 COMPREHEN METABOLIC PANEL: CPT

## 2025-01-17 PROCEDURE — 36415 COLL VENOUS BLD VENIPUNCTURE: CPT

## 2025-01-17 PROCEDURE — 82607 VITAMIN B-12: CPT

## 2025-01-17 PROCEDURE — 80061 LIPID PANEL: CPT

## 2025-01-17 PROCEDURE — 83036 HEMOGLOBIN GLYCOSYLATED A1C: CPT

## 2025-01-24 ENCOUNTER — APPOINTMENT (OUTPATIENT)
Dept: MEDICAL GROUP | Facility: MEDICAL CENTER | Age: 45
End: 2025-01-24
Payer: COMMERCIAL

## 2025-01-24 VITALS
HEIGHT: 66 IN | TEMPERATURE: 97.9 F | OXYGEN SATURATION: 97 % | HEART RATE: 60 BPM | BODY MASS INDEX: 24.45 KG/M2 | WEIGHT: 152.12 LBS | SYSTOLIC BLOOD PRESSURE: 92 MMHG | DIASTOLIC BLOOD PRESSURE: 54 MMHG

## 2025-01-24 DIAGNOSIS — E78.5 DYSLIPIDEMIA: ICD-10-CM

## 2025-01-24 DIAGNOSIS — E11.9 TYPE 2 DIABETES MELLITUS WITHOUT COMPLICATION, WITHOUT LONG-TERM CURRENT USE OF INSULIN (HCC): ICD-10-CM

## 2025-01-24 PROCEDURE — 99214 OFFICE O/P EST MOD 30 MIN: CPT | Performed by: FAMILY MEDICINE

## 2025-01-24 PROCEDURE — 3074F SYST BP LT 130 MM HG: CPT | Performed by: FAMILY MEDICINE

## 2025-01-24 PROCEDURE — 3078F DIAST BP <80 MM HG: CPT | Performed by: FAMILY MEDICINE

## 2025-01-24 RX ORDER — PIOGLITAZONE 45 MG/1
45 TABLET ORAL NIGHTLY
Qty: 90 TABLET | Refills: 3 | Status: SHIPPED | OUTPATIENT
Start: 2025-01-24

## 2025-01-24 ASSESSMENT — PATIENT HEALTH QUESTIONNAIRE - PHQ9: CLINICAL INTERPRETATION OF PHQ2 SCORE: 0

## 2025-01-24 ASSESSMENT — ENCOUNTER SYMPTOMS
PALPITATIONS: 0
FEVER: 0
CHILLS: 0

## 2025-01-25 NOTE — PROGRESS NOTES
Verbal consent was acquired by the patient to use [employer] ambient listening note generation during this visit.      Jhonatan was seen today for follow-up.    Diagnoses and all orders for this visit:    Type 2 diabetes mellitus without complication, without long-term current use of insulin (HCC)  -     pioglitazone (ACTOS) 45 MG Tab; Take 1 Tablet by mouth every evening.  -     Comp Metabolic Panel; Future  -     HEMOGLOBIN A1C; Future    Dyslipidemia                  Assessment & Plan  1. Diabetes Mellitus  Chronic condition, unstable  - Blood glucose levels increased, likely due to dietary indiscretions during the holiday season  - Advised to maintain a list of medications for reference during potential emergency room visits  - Recommended dietary modifications: reduce intake of sugars and carbohydrates (white bread, pasta, noodles, refined sugars)  - Continue current regimen of Jardiance and metformin  - Addition of pioglitazone 45 mg daily to be taken at bedtime  - Prescription for pioglitazone sent to pharmacy  - Order for blood work including A1c and metabolic panel placed  - Instructed to discontinue pioglitazone and inform if experiencing leg swelling (potential side effect)  - B12 levels slightly low, likely due to metformin reducing B12 absorption  - Advised to take over-the-counter B12 supplements (1000 mcg daily)    3. Hyperlipidemia  Chronic condition, stable  - Cholesterol levels within normal limits  - Continue taking rosuvastatin 10 mg as prescribed    Follow-up  - Scheduled for a follow-up visit in 4 months for lab follow-up.          Chief complaint::Diagnoses of Type 2 diabetes mellitus without complication, without long-term current use of insulin (HCC) and Dyslipidemia were pertinent to this visit.      History of Present Illness  The patient is a 44-year-old male who presents for a blood test follow-up.    Medication Regimen  - Adhering to a regimen of Jardiance and metformin, with Jardiance  "administered in the morning and metformin at bedtime.  - Previously on a course of Trulicity injections, which resulted in significant weight loss.  - Not currently taking aspirin.  - Takes rosuvastatin for cholesterol.      IMMUNIZATIONS  - Declined influenza, pneumonia, and hepatitis vaccines      Review of Systems   Constitutional:  Negative for chills and fever.   Cardiovascular:  Negative for chest pain, palpitations and leg swelling.          Medications and Allergies:     Current Outpatient Medications   Medication Sig Dispense Refill    pioglitazone (ACTOS) 45 MG Tab Take 1 Tablet by mouth every evening. 90 Tablet 3    rosuvastatin (CRESTOR) 10 MG Tab Take 1 Tablet by mouth every evening. 90 Tablet 3    metformin (GLUCOPHAGE) 1000 MG tablet Take 1 Tablet by mouth 2 times a day with meals. 180 Tablet 3    Empagliflozin 25 MG Tab Take 1 Tablet by mouth every day. 90 Tablet 3    ONE TOUCH ULTRA TEST strip TEST THREE TIMES DAILY 300 Strip 0     No current facility-administered medications for this visit.       BP 92/54 (BP Location: Left arm, Patient Position: Sitting, BP Cuff Size: Adult)   Pulse 60   Temp 36.6 °C (97.9 °F) (Temporal)   Ht 1.676 m (5' 6\")   Wt 69 kg (152 lb 1.9 oz)   SpO2 97% , Body mass index is 24.55 kg/m².      Physical Exam  Constitutional:       Appearance: Normal appearance. He is well-developed and well-groomed.   HENT:      Head: Normocephalic and atraumatic.      Right Ear: External ear normal.      Left Ear: External ear normal.   Eyes:      General:         Right eye: No discharge.         Left eye: No discharge.      Conjunctiva/sclera: Conjunctivae normal.   Cardiovascular:      Rate and Rhythm: Normal rate.   Pulmonary:      Effort: Pulmonary effort is normal. No respiratory distress.   Musculoskeletal:      Cervical back: Neck supple.   Skin:     Findings: No rash.   Neurological:      Mental Status: He is alert.   Psychiatric:         Mood and Affect: Mood and affect " normal.         Behavior: Behavior normal.            I reviewed with patient lab results resulted on 1/17/2025.        Please note that this dictation was created using voice recognition software. I have made every reasonable attempt to correct obvious errors, but I expect that there are errors of grammar and possibly content that I did not discover before finalizing the note.

## 2025-03-05 DIAGNOSIS — E11.65 TYPE 2 DIABETES MELLITUS WITH HYPERGLYCEMIA, WITH LONG-TERM CURRENT USE OF INSULIN (HCC): ICD-10-CM

## 2025-03-05 DIAGNOSIS — Z79.4 TYPE 2 DIABETES MELLITUS WITH HYPERGLYCEMIA, WITH LONG-TERM CURRENT USE OF INSULIN (HCC): ICD-10-CM

## 2025-03-05 RX ORDER — EMPAGLIFLOZIN 25 MG/1
1 TABLET, FILM COATED ORAL
Qty: 90 TABLET | Refills: 3 | Status: SHIPPED | OUTPATIENT
Start: 2025-03-05

## 2025-03-05 NOTE — TELEPHONE ENCOUNTER
Received request via: Pharmacy    Was the patient seen in the last year in this department? Yes    Does the patient have an active prescription (recently filled or refills available) for medication(s) requested? No    [facility] Name[facility]    Does the patient have halfway Plus and need 100-day supply? (This applies to ALL medications) Patient does not have SCP

## 2025-04-02 DIAGNOSIS — E11.69 DYSLIPIDEMIA ASSOCIATED WITH TYPE 2 DIABETES MELLITUS (HCC): ICD-10-CM

## 2025-04-02 DIAGNOSIS — E78.5 DYSLIPIDEMIA ASSOCIATED WITH TYPE 2 DIABETES MELLITUS (HCC): ICD-10-CM

## 2025-04-03 RX ORDER — ROSUVASTATIN CALCIUM 10 MG/1
10 TABLET, COATED ORAL EVERY EVENING
Qty: 90 TABLET | Refills: 3 | Status: SHIPPED | OUTPATIENT
Start: 2025-04-03

## 2025-05-27 DIAGNOSIS — E11.9 TYPE 2 DIABETES MELLITUS WITHOUT COMPLICATION, WITHOUT LONG-TERM CURRENT USE OF INSULIN (HCC): ICD-10-CM

## 2025-05-30 ENCOUNTER — TELEPHONE (OUTPATIENT)
Dept: HEALTH INFORMATION MANAGEMENT | Facility: OTHER | Age: 45
End: 2025-05-30
Payer: COMMERCIAL

## 2025-08-07 ENCOUNTER — APPOINTMENT (OUTPATIENT)
Dept: MEDICAL GROUP | Facility: MEDICAL CENTER | Age: 45
End: 2025-08-07
Payer: COMMERCIAL

## 2025-08-08 ENCOUNTER — HOSPITAL ENCOUNTER (OUTPATIENT)
Facility: MEDICAL CENTER | Age: 45
End: 2025-08-08
Attending: FAMILY MEDICINE
Payer: COMMERCIAL

## 2025-08-08 ENCOUNTER — APPOINTMENT (OUTPATIENT)
Dept: MEDICAL GROUP | Facility: MEDICAL CENTER | Age: 45
End: 2025-08-08
Payer: COMMERCIAL

## 2025-08-08 PROBLEM — E53.8 VITAMIN B12 DEFICIENCY: Status: ACTIVE | Noted: 2025-08-08

## 2025-08-08 LAB
CREAT UR-MCNC: 56.3 MG/DL
MICROALBUMIN UR-MCNC: <1.2 MG/DL
MICROALBUMIN/CREAT UR: NORMAL MG/G (ref 0–30)

## 2025-08-08 PROCEDURE — 82043 UR ALBUMIN QUANTITATIVE: CPT

## 2025-08-08 PROCEDURE — 82570 ASSAY OF URINE CREATININE: CPT

## 2025-08-08 ASSESSMENT — ENCOUNTER SYMPTOMS
CHILLS: 0
FEVER: 0
PALPITATIONS: 0